# Patient Record
Sex: FEMALE | Race: BLACK OR AFRICAN AMERICAN | NOT HISPANIC OR LATINO | URBAN - METROPOLITAN AREA
[De-identification: names, ages, dates, MRNs, and addresses within clinical notes are randomized per-mention and may not be internally consistent; named-entity substitution may affect disease eponyms.]

---

## 2018-02-19 ENCOUNTER — EMERGENCY (EMERGENCY)
Facility: HOSPITAL | Age: 38
LOS: 1 days | Discharge: HOME | End: 2018-02-19
Attending: STUDENT IN AN ORGANIZED HEALTH CARE EDUCATION/TRAINING PROGRAM
Payer: MEDICAID

## 2018-02-19 VITALS
WEIGHT: 220.02 LBS | SYSTOLIC BLOOD PRESSURE: 150 MMHG | HEART RATE: 96 BPM | HEIGHT: 66 IN | OXYGEN SATURATION: 97 % | RESPIRATION RATE: 20 BRPM | DIASTOLIC BLOOD PRESSURE: 84 MMHG | TEMPERATURE: 98 F

## 2018-02-19 DIAGNOSIS — Z98.890 OTHER SPECIFIED POSTPROCEDURAL STATES: Chronic | ICD-10-CM

## 2018-02-19 LAB
BASOPHILS # BLD AUTO: 0 K/UL — SIGNIFICANT CHANGE UP (ref 0–0.2)
BASOPHILS NFR BLD AUTO: 0.3 % — SIGNIFICANT CHANGE UP (ref 0–2)
EOSINOPHIL # BLD AUTO: 0.1 K/UL — SIGNIFICANT CHANGE UP (ref 0–0.5)
EOSINOPHIL NFR BLD AUTO: 0.9 % — SIGNIFICANT CHANGE UP (ref 0–6)
HCT VFR BLD CALC: 43 % — SIGNIFICANT CHANGE UP (ref 37–47)
HGB BLD-MCNC: 13.4 G/DL — SIGNIFICANT CHANGE UP (ref 12–16)
LYMPHOCYTES # BLD AUTO: 3 K/UL — SIGNIFICANT CHANGE UP (ref 1–4.8)
LYMPHOCYTES # BLD AUTO: 43.1 % — SIGNIFICANT CHANGE UP (ref 20–55)
MCHC RBC-ENTMCNC: 27.2 PG — SIGNIFICANT CHANGE UP (ref 27–31)
MCHC RBC-ENTMCNC: 31.2 G/DL — LOW (ref 32–36)
MCV RBC AUTO: 87.2 FL — SIGNIFICANT CHANGE UP (ref 81–99)
MONOCYTES # BLD AUTO: 0.6 K/UL — SIGNIFICANT CHANGE UP (ref 0–0.8)
MONOCYTES NFR BLD AUTO: 8.6 % — SIGNIFICANT CHANGE UP (ref 3–10)
NEUTROPHILS # BLD AUTO: 3.2 K/UL — SIGNIFICANT CHANGE UP (ref 1.8–8)
NEUTROPHILS NFR BLD AUTO: 47 % — SIGNIFICANT CHANGE UP (ref 37–73)
PLATELET # BLD AUTO: 257 K/UL — SIGNIFICANT CHANGE UP (ref 150–400)
RBC # BLD: 4.93 M/UL — SIGNIFICANT CHANGE UP (ref 4.4–5.2)
RBC # FLD: 15.1 % — SIGNIFICANT CHANGE UP (ref 11–15.6)
WBC # BLD: 6.9 K/UL — SIGNIFICANT CHANGE UP (ref 4.8–10.8)
WBC # FLD AUTO: 6.9 K/UL — SIGNIFICANT CHANGE UP (ref 4.8–10.8)

## 2018-02-19 PROCEDURE — 99284 EMERGENCY DEPT VISIT MOD MDM: CPT

## 2018-02-19 RX ORDER — ONDANSETRON 8 MG/1
4 TABLET, FILM COATED ORAL ONCE
Qty: 0 | Refills: 0 | Status: COMPLETED | OUTPATIENT
Start: 2018-02-19 | End: 2018-02-19

## 2018-02-19 RX ORDER — KETOROLAC TROMETHAMINE 30 MG/ML
15 SYRINGE (ML) INJECTION ONCE
Qty: 0 | Refills: 0 | Status: DISCONTINUED | OUTPATIENT
Start: 2018-02-19 | End: 2018-02-19

## 2018-02-19 RX ORDER — SODIUM CHLORIDE 9 MG/ML
1000 INJECTION INTRAMUSCULAR; INTRAVENOUS; SUBCUTANEOUS
Qty: 0 | Refills: 0 | Status: DISCONTINUED | OUTPATIENT
Start: 2018-02-19 | End: 2018-02-23

## 2018-02-19 RX ORDER — SODIUM CHLORIDE 9 MG/ML
3 INJECTION INTRAMUSCULAR; INTRAVENOUS; SUBCUTANEOUS ONCE
Qty: 0 | Refills: 0 | Status: COMPLETED | OUTPATIENT
Start: 2018-02-19 | End: 2018-02-19

## 2018-02-19 RX ORDER — SODIUM CHLORIDE 9 MG/ML
1000 INJECTION INTRAMUSCULAR; INTRAVENOUS; SUBCUTANEOUS ONCE
Qty: 0 | Refills: 0 | Status: COMPLETED | OUTPATIENT
Start: 2018-02-19 | End: 2018-02-19

## 2018-02-19 RX ORDER — MORPHINE SULFATE 50 MG/1
4 CAPSULE, EXTENDED RELEASE ORAL ONCE
Qty: 0 | Refills: 0 | Status: DISCONTINUED | OUTPATIENT
Start: 2018-02-19 | End: 2018-02-19

## 2018-02-19 RX ADMIN — SODIUM CHLORIDE 1000 MILLILITER(S): 9 INJECTION INTRAMUSCULAR; INTRAVENOUS; SUBCUTANEOUS at 23:20

## 2018-02-19 RX ADMIN — Medication 15 MILLIGRAM(S): at 23:20

## 2018-02-19 RX ADMIN — MORPHINE SULFATE 4 MILLIGRAM(S): 50 CAPSULE, EXTENDED RELEASE ORAL at 23:21

## 2018-02-19 RX ADMIN — SODIUM CHLORIDE 3 MILLILITER(S): 9 INJECTION INTRAMUSCULAR; INTRAVENOUS; SUBCUTANEOUS at 23:22

## 2018-02-19 RX ADMIN — ONDANSETRON 4 MILLIGRAM(S): 8 TABLET, FILM COATED ORAL at 23:20

## 2018-02-19 RX ADMIN — Medication 15 MILLIGRAM(S): at 23:50

## 2018-02-19 RX ADMIN — MORPHINE SULFATE 4 MILLIGRAM(S): 50 CAPSULE, EXTENDED RELEASE ORAL at 23:50

## 2018-02-19 NOTE — ED ADULT NURSE NOTE - CHPI ED SYMPTOMS NEG
no abdominal distension/no hematuria/no blood in stool/no vomiting/no burning urination/no dysuria/no chills/no fever

## 2018-02-19 NOTE — ED PROVIDER NOTE - CONSTITUTIONAL, MLM
normal... Well appearing, well nourished, awake, alert, oriented to person, place, time/situation and in mild to moderate painful distress.

## 2018-02-19 NOTE — ED PROVIDER NOTE - PROGRESS NOTE DETAILS
CT is as noted. Pain with diminished tenderness but continues to have abdominal discomfort. Surgical consultation recommended. CT is as noted. Pain with diminished tenderness but continues to have abdominal discomfort more upper then lower. Surgical consultation recommended. Patient improved. Surgery cleared. Patient feeling better. Will need treatment for likely gastritis as cause of symptoms.

## 2018-02-19 NOTE — ED ADULT NURSE NOTE - OBJECTIVE STATEMENT
Sudden onset, RUQ twisting abd pain radiating into back with associated nausea, no vomiting, reports scant amt diarrhea episode, reports decreased appetite, denies any other complaints.  Reports hernia repair 2015 and pain in that area. Reports D&C 01/10/18 for fibroids

## 2018-02-19 NOTE — ED PROVIDER NOTE - OBJECTIVE STATEMENT
37 y/o F w/ PMHx of peptic ulcer and gastritis (dx few months ago) presents to ED c/o abd pain since this AM. Associated sx include nausea. Pt states she woke up and ate breakfast when her pain suddenly onset. Pain is described as cramping, stabbing pain. She reports taking TUMS to no relief. Denies vomiting, diarrhea, fever, chills or any other complaints at this time. PMD: Dr. Johnson in [place], [place]. 39 y/o F w/ PMHx of peptic ulcer and gastritis (dx few months ago) presents to ED c/o abd pain since this AM. Associated sx include nausea. Pt states she woke up and ate breakfast after which her pain suddenly onset. Pain is described as cramping, and stabbing. She reports taking TUMS to no relief. Denies vomiting, diarrhea, fever, chills or any other complaints at this time. PMD: Dr. Johnson in [place], [place]. 39 y/o F w/ PMHx of peptic ulcer and gastritis (dx few months ago) presents to ED c/o abd pain since this AM. Associated sx include nausea. Pt states she woke up and ate breakfast after which her pain suddenly onset. Pain is described as cramping, and stabbing. She reports taking TUMS to no relief. Denies vomiting, diarrhea, fever, chills or any other complaints at this time. PMD: Dr. Valdivia in [place], [place].

## 2018-02-20 VITALS
HEART RATE: 74 BPM | TEMPERATURE: 98 F | DIASTOLIC BLOOD PRESSURE: 76 MMHG | OXYGEN SATURATION: 97 % | RESPIRATION RATE: 20 BRPM | SYSTOLIC BLOOD PRESSURE: 126 MMHG

## 2018-02-20 LAB
ALBUMIN SERPL ELPH-MCNC: 3.9 G/DL — SIGNIFICANT CHANGE UP (ref 3.3–5.2)
ALP SERPL-CCNC: 78 U/L — SIGNIFICANT CHANGE UP (ref 40–120)
ALT FLD-CCNC: 20 U/L — SIGNIFICANT CHANGE UP
ANION GAP SERPL CALC-SCNC: 14 MMOL/L — SIGNIFICANT CHANGE UP (ref 5–17)
APPEARANCE UR: ABNORMAL
AST SERPL-CCNC: 18 U/L — SIGNIFICANT CHANGE UP
BACTERIA # UR AUTO: ABNORMAL
BILIRUB SERPL-MCNC: 0.4 MG/DL — SIGNIFICANT CHANGE UP (ref 0.4–2)
BILIRUB UR-MCNC: NEGATIVE — SIGNIFICANT CHANGE UP
BUN SERPL-MCNC: 8 MG/DL — SIGNIFICANT CHANGE UP (ref 8–20)
CALCIUM SERPL-MCNC: 9.3 MG/DL — SIGNIFICANT CHANGE UP (ref 8.6–10.2)
CHLORIDE SERPL-SCNC: 100 MMOL/L — SIGNIFICANT CHANGE UP (ref 98–107)
CO2 SERPL-SCNC: 24 MMOL/L — SIGNIFICANT CHANGE UP (ref 22–29)
COLOR SPEC: YELLOW — SIGNIFICANT CHANGE UP
COMMENT - URINE: SIGNIFICANT CHANGE UP
COMMENT - URINE: SIGNIFICANT CHANGE UP
CREAT SERPL-MCNC: 0.54 MG/DL — SIGNIFICANT CHANGE UP (ref 0.5–1.3)
DIFF PNL FLD: NEGATIVE — SIGNIFICANT CHANGE UP
EPI CELLS # UR: ABNORMAL
GLUCOSE SERPL-MCNC: 106 MG/DL — SIGNIFICANT CHANGE UP (ref 70–115)
GLUCOSE UR QL: NEGATIVE MG/DL — SIGNIFICANT CHANGE UP
HCG UR QL: NEGATIVE — SIGNIFICANT CHANGE UP
KETONES UR-MCNC: ABNORMAL
LEUKOCYTE ESTERASE UR-ACNC: ABNORMAL
LIDOCAIN IGE QN: 24 U/L — SIGNIFICANT CHANGE UP (ref 22–51)
NITRITE UR-MCNC: NEGATIVE — SIGNIFICANT CHANGE UP
PH UR: 6 — SIGNIFICANT CHANGE UP (ref 5–8)
POTASSIUM SERPL-MCNC: 4 MMOL/L — SIGNIFICANT CHANGE UP (ref 3.5–5.3)
POTASSIUM SERPL-SCNC: 4 MMOL/L — SIGNIFICANT CHANGE UP (ref 3.5–5.3)
PROT SERPL-MCNC: 8.2 G/DL — SIGNIFICANT CHANGE UP (ref 6.6–8.7)
PROT UR-MCNC: 15 MG/DL
RBC CASTS # UR COMP ASSIST: SIGNIFICANT CHANGE UP /HPF (ref 0–4)
SODIUM SERPL-SCNC: 138 MMOL/L — SIGNIFICANT CHANGE UP (ref 135–145)
SP GR SPEC: 1.02 — SIGNIFICANT CHANGE UP (ref 1.01–1.02)
UROBILINOGEN FLD QL: NEGATIVE MG/DL — SIGNIFICANT CHANGE UP
WBC UR QL: SIGNIFICANT CHANGE UP

## 2018-02-20 PROCEDURE — 74177 CT ABD & PELVIS W/CONTRAST: CPT

## 2018-02-20 PROCEDURE — 96374 THER/PROPH/DIAG INJ IV PUSH: CPT | Mod: XU

## 2018-02-20 PROCEDURE — 96375 TX/PRO/DX INJ NEW DRUG ADDON: CPT

## 2018-02-20 PROCEDURE — 81001 URINALYSIS AUTO W/SCOPE: CPT

## 2018-02-20 PROCEDURE — 81025 URINE PREGNANCY TEST: CPT

## 2018-02-20 PROCEDURE — 85027 COMPLETE CBC AUTOMATED: CPT

## 2018-02-20 PROCEDURE — 74177 CT ABD & PELVIS W/CONTRAST: CPT | Mod: 26

## 2018-02-20 PROCEDURE — 96376 TX/PRO/DX INJ SAME DRUG ADON: CPT

## 2018-02-20 PROCEDURE — 84702 CHORIONIC GONADOTROPIN TEST: CPT

## 2018-02-20 PROCEDURE — 36415 COLL VENOUS BLD VENIPUNCTURE: CPT

## 2018-02-20 PROCEDURE — 83690 ASSAY OF LIPASE: CPT

## 2018-02-20 PROCEDURE — 80053 COMPREHEN METABOLIC PANEL: CPT

## 2018-02-20 PROCEDURE — 99284 EMERGENCY DEPT VISIT MOD MDM: CPT | Mod: 25

## 2018-02-20 RX ORDER — METOCLOPRAMIDE HCL 10 MG
10 TABLET ORAL ONCE
Qty: 0 | Refills: 0 | Status: COMPLETED | OUTPATIENT
Start: 2018-02-20 | End: 2018-02-20

## 2018-02-20 RX ORDER — KETOROLAC TROMETHAMINE 30 MG/ML
15 SYRINGE (ML) INJECTION ONCE
Qty: 0 | Refills: 0 | Status: DISCONTINUED | OUTPATIENT
Start: 2018-02-20 | End: 2018-02-20

## 2018-02-20 RX ORDER — SUCRALFATE 1 G
10 TABLET ORAL
Qty: 200 | Refills: 0 | OUTPATIENT
Start: 2018-02-20 | End: 2018-02-24

## 2018-02-20 RX ORDER — LIDOCAINE 4 G/100G
10 CREAM TOPICAL ONCE
Qty: 0 | Refills: 0 | Status: COMPLETED | OUTPATIENT
Start: 2018-02-20 | End: 2018-02-20

## 2018-02-20 RX ORDER — FAMOTIDINE 10 MG/ML
20 INJECTION INTRAVENOUS ONCE
Qty: 0 | Refills: 0 | Status: COMPLETED | OUTPATIENT
Start: 2018-02-20 | End: 2018-02-20

## 2018-02-20 RX ORDER — MORPHINE SULFATE 50 MG/1
4 CAPSULE, EXTENDED RELEASE ORAL ONCE
Qty: 0 | Refills: 0 | Status: DISCONTINUED | OUTPATIENT
Start: 2018-02-20 | End: 2018-02-20

## 2018-02-20 RX ORDER — OMEPRAZOLE 10 MG/1
1 CAPSULE, DELAYED RELEASE ORAL
Qty: 21 | Refills: 0 | OUTPATIENT
Start: 2018-02-20 | End: 2018-03-12

## 2018-02-20 RX ADMIN — FAMOTIDINE 20 MILLIGRAM(S): 10 INJECTION INTRAVENOUS at 06:03

## 2018-02-20 RX ADMIN — SODIUM CHLORIDE 125 MILLILITER(S): 9 INJECTION INTRAMUSCULAR; INTRAVENOUS; SUBCUTANEOUS at 01:27

## 2018-02-20 RX ADMIN — Medication 15 MILLIGRAM(S): at 01:26

## 2018-02-20 RX ADMIN — Medication 30 MILLILITER(S): at 06:02

## 2018-02-20 RX ADMIN — MORPHINE SULFATE 4 MILLIGRAM(S): 50 CAPSULE, EXTENDED RELEASE ORAL at 02:00

## 2018-02-20 RX ADMIN — Medication 10 MILLIGRAM(S): at 03:51

## 2018-02-20 RX ADMIN — MORPHINE SULFATE 4 MILLIGRAM(S): 50 CAPSULE, EXTENDED RELEASE ORAL at 01:26

## 2018-02-20 RX ADMIN — Medication 15 MILLIGRAM(S): at 02:18

## 2018-02-20 RX ADMIN — LIDOCAINE 10 MILLILITER(S): 4 CREAM TOPICAL at 06:02

## 2018-02-20 NOTE — CONSULT NOTE ADULT - SUBJECTIVE AND OBJECTIVE BOX
ACUTE CARE SURGERY CONSULT    Consulting surgical team: [facility] - Acute Care Surgery (pager [phone])  Consulting attending:  Patient seen and examined: 18 @ 09:47    HPI:  Patient is a 38y Female with PMHx of PUD- managed by her PMD. Patient is seen in the ED with cc of epigastric pain that radiates to the back and "burning" in nature. She states that the pain began yesterday after a meal and nothing has either improved or relieved it . She denies f/c/n/v, SOB or CP. Last meal was around 5:30pm. She denies ever having RLQ pain. She describes this abdominal pain similar to when she was first dx with PUD.          PAST MEDICAL HISTORY:  Gastritis   PUD    PAST SURGICAL HISTORY:  Umbilical hernia repair @ [facility] in     [facility]    ALLERGIES:  No Known Allergies    SOCIAL HISTORY:   States 1/2 ppd  denies illegal substance abuse or EtOH use     FAMILY HISTORY:  Grandmother- DM  Mother HTN.     MEDICATIONS  (STANDING):  sodium chloride 0.9%. 1000 milliLiter(s) (125 mL/Hr) IV Continuous <Continuous>    MEDICATIONS  (PRN):      VITALS & I/Os:  Vital Signs Last 24 Hrs  T(C): 36.4 (2018 06:16), Max: 36.9 (2018 20:26)  T(F): 97.6 (2018 06:16), Max: 98.4 (2018 20:26)  HR: 74 (2018 06:16) (74 - 96)  BP: 126/76 (2018 06:16) (119/83 - 150/84)  BP(mean): --  RR: 20 (2018 06:16) (20 - 20)  SpO2: 97% (2018 06:16) (96% - 97%)  CAPILLARY BLOOD GLUCOSE          I&O's Summary        GEN: NAD, alert and oriented x 3  HEENT: WNL  CHEST: Symmetrical chest rise, breath sounds CTAB  HEART: RRR, non-muffled heart sounds  ABD: Soft, non-tender in the RLQ, tender in the epigastric area only, non-distended, (-)Mcburney's sign, no rebound or guarding   EXT/VASC: no pitting edema     LABS:                        13.4   6.9   )-----------( 257      ( 2018 23:35 )             43.0     02-    138  |  100  |  8.0  ----------------------------<  106  4.0   |  24.0  |  0.54    Ca    9.3      2018 23:35    TPro  8.2  /  Alb  3.9  /  TBili  0.4  /  DBili  x   /  AST  18  /  ALT  20  /  AlkPhos  78  02-    Lactate: sodium chloride 0.9%. 1000 milliLiter(s) IV Continuous <Continuous>               Urinalysis Basic - ( 2018 01:36 )    Color: Yellow / Appearance: Slightly Turbid / S.025 / pH: x  Gluc: x / Ketone: Trace  / Bili: Negative / Urobili: Negative mg/dL   Blood: x / Protein: 15 mg/dL / Nitrite: Negative   Leuk Esterase: Trace / RBC: 0-2 /HPF / WBC 3-5   Sq Epi: x / Non Sq Epi: Many / Bacteria: Moderate        IMAGING:    CT A/P: Oral contrast is seen within normal caliber small bowel loops. No evidence of bowel obstruction. The appendix is borderline in axial   dimension measuring up to 7 mm. Air fills the distal tip and there are no surrounding inflammatory changes. Trace hyperenhancement of the wall may   be present. Scattered diverticula noted without evidence of diverticulitis ACUTE CARE SURGERY CONSULT    Consulting surgical team: [facility] - Acute Care Surgery (pager [phone])  Consulting attending:  Patient seen and examined: 18 @ 04:47    HPI:  Patient is a 38y Female with PMHx of PUD- managed by her PMD. Patient is seen in the ED with cc of epigastric pain that radiates to the back and "burning" in nature. She states that the pain began yesterday after a meal and nothing has either improved or relieved it . She denies f/c/n/v, SOB or CP. Last meal was around 5:30pm. She denies ever having RLQ pain. She describes this abdominal pain similar to when she was first dx with PUD.          PAST MEDICAL HISTORY:  Gastritis   PUD    PAST SURGICAL HISTORY:  Umbilical hernia repair @ [facility] in     [place]    ALLERGIES:  No Known Allergies    SOCIAL HISTORY:   States 1/2 ppd  denies illegal substance abuse or EtOH use     FAMILY HISTORY:  Grandmother- DM  Mother HTN.     MEDICATIONS  (STANDING):  sodium chloride 0.9%. 1000 milliLiter(s) (125 mL/Hr) IV Continuous <Continuous>    MEDICATIONS  (PRN):      VITALS & I/Os:  Vital Signs Last 24 Hrs  T(C): 36.4 (2018 06:16), Max: 36.9 (2018 20:26)  T(F): 97.6 (2018 06:16), Max: 98.4 (2018 20:26)  HR: 74 (2018 06:16) (74 - 96)  BP: 126/76 (2018 06:16) (119/83 - 150/84)  BP(mean): --  RR: 20 (2018 06:16) (20 - 20)  SpO2: 97% (2018 06:16) (96% - 97%)  CAPILLARY BLOOD GLUCOSE          I&O's Summary        GEN: NAD, alert and oriented x 3  HEENT: WNL  CHEST: Symmetrical chest rise, breath sounds CTAB  HEART: RRR, non-muffled heart sounds  ABD: Soft, non-tender in the RLQ, tender in the epigastric area only, non-distended, (-)Mcburney's sign, no rebound or guarding   EXT/VASC: no pitting edema     LABS:                        13.4   6.9   )-----------( 257      ( 2018 23:35 )             43.0     02-    138  |  100  |  8.0  ----------------------------<  106  4.0   |  24.0  |  0.54    Ca    9.3      2018 23:35    TPro  8.2  /  Alb  3.9  /  TBili  0.4  /  DBili  x   /  AST  18  /  ALT  20  /  AlkPhos  78  02-    Lactate: sodium chloride 0.9%. 1000 milliLiter(s) IV Continuous <Continuous>               Urinalysis Basic - ( 2018 01:36 )    Color: Yellow / Appearance: Slightly Turbid / S.025 / pH: x  Gluc: x / Ketone: Trace  / Bili: Negative / Urobili: Negative mg/dL   Blood: x / Protein: 15 mg/dL / Nitrite: Negative   Leuk Esterase: Trace / RBC: 0-2 /HPF / WBC 3-5   Sq Epi: x / Non Sq Epi: Many / Bacteria: Moderate        IMAGING:    CT A/P: Oral contrast is seen within normal caliber small bowel loops. No evidence of bowel obstruction. The appendix is borderline in axial   dimension measuring up to 7 mm. Air fills the distal tip and there are no surrounding inflammatory changes. Trace hyperenhancement of the wall may   be present. Scattered diverticula noted without evidence of diverticulitis

## 2018-02-20 NOTE — ED ADULT NURSE REASSESSMENT NOTE - NS ED NURSE REASSESS COMMENT FT1
POC discussed with pt who verbalize abd discomfort as unchanged, non-tender on palpation, MD aware of finding, awaiting orders, pt awaiting surgical consult, pt verbalize understanding.  Safety measures maintained.

## 2018-02-20 NOTE — CONSULT NOTE ADULT - ASSESSMENT
ASSESSMENT & PLAN       38y F with epigastric pain. Most likely due to patient's hx of PUD. Least likely acute appendicitis       Discussed with Dr. Soriano    -no acute surgical intervention is indicated  - recommend GI eval for PUD  -r/o H.Pylori  -serial abd exam  -if patient is admitted will follow ASSESSMENT & PLAN       38y F with epigastric pain. Most likely due to patient's hx of PUD. Least likely acute appendicitis. Plan was d/w Dr. Jaeger in person      Discussed with Dr. Soriano    -no acute surgical intervention is indicated  - recommend GI eval for PUD  -r/o H.Pylori  -serial abd exam  -if patient is admitted will follow

## 2018-02-20 NOTE — ED ADULT NURSE REASSESSMENT NOTE - NS ED NURSE REASSESS COMMENT FT1
Pt reports feeling better, discharge instructions given and explained, including discharge medication purpose, dose, frequency and s/e, pt verbalized understanding of instructions, questions encouraged and answered appropriately, PIV d/c'd per facility protocol, pt tolerated well, DCD in place, pt safely discharged home.

## 2018-08-12 ENCOUNTER — EMERGENCY (EMERGENCY)
Facility: HOSPITAL | Age: 38
LOS: 1 days | Discharge: HOME | End: 2018-08-12
Attending: EMERGENCY MEDICINE
Payer: MEDICAID

## 2018-08-12 VITALS
TEMPERATURE: 98 F | DIASTOLIC BLOOD PRESSURE: 74 MMHG | OXYGEN SATURATION: 96 % | HEART RATE: 72 BPM | SYSTOLIC BLOOD PRESSURE: 120 MMHG | RESPIRATION RATE: 19 BRPM

## 2018-08-12 VITALS — HEIGHT: 67 IN | WEIGHT: 199.96 LBS

## 2018-08-12 DIAGNOSIS — Z98.890 OTHER SPECIFIED POSTPROCEDURAL STATES: Chronic | ICD-10-CM

## 2018-08-12 PROBLEM — K29.70 GASTRITIS, UNSPECIFIED, WITHOUT BLEEDING: Chronic | Status: ACTIVE | Noted: 2018-02-26

## 2018-08-12 PROBLEM — K27.9 PEPTIC ULCER, SITE UNSPECIFIED, UNSPECIFIED AS ACUTE OR CHRONIC, WITHOUT HEMORRHAGE OR PERFORATION: Chronic | Status: ACTIVE | Noted: 2018-02-26

## 2018-08-12 LAB
ALBUMIN SERPL ELPH-MCNC: 3.7 G/DL — SIGNIFICANT CHANGE UP (ref 3.3–5.2)
ALP SERPL-CCNC: 96 U/L — SIGNIFICANT CHANGE UP (ref 40–120)
ALT FLD-CCNC: 28 U/L — SIGNIFICANT CHANGE UP
ANION GAP SERPL CALC-SCNC: 13 MMOL/L — SIGNIFICANT CHANGE UP (ref 5–17)
APPEARANCE UR: CLEAR — SIGNIFICANT CHANGE UP
AST SERPL-CCNC: 32 U/L — HIGH
BACTERIA # UR AUTO: ABNORMAL
BASOPHILS # BLD AUTO: 0 K/UL — SIGNIFICANT CHANGE UP (ref 0–0.2)
BASOPHILS NFR BLD AUTO: 0.5 % — SIGNIFICANT CHANGE UP (ref 0–2)
BILIRUB SERPL-MCNC: 0.3 MG/DL — LOW (ref 0.4–2)
BILIRUB UR-MCNC: NEGATIVE — SIGNIFICANT CHANGE UP
BUN SERPL-MCNC: 8 MG/DL — SIGNIFICANT CHANGE UP (ref 8–20)
CALCIUM SERPL-MCNC: 9.6 MG/DL — SIGNIFICANT CHANGE UP (ref 8.6–10.2)
CHLORIDE SERPL-SCNC: 102 MMOL/L — SIGNIFICANT CHANGE UP (ref 98–107)
CO2 SERPL-SCNC: 24 MMOL/L — SIGNIFICANT CHANGE UP (ref 22–29)
COLOR SPEC: YELLOW — SIGNIFICANT CHANGE UP
CREAT SERPL-MCNC: 0.57 MG/DL — SIGNIFICANT CHANGE UP (ref 0.5–1.3)
DIFF PNL FLD: ABNORMAL
EOSINOPHIL # BLD AUTO: 0.1 K/UL — SIGNIFICANT CHANGE UP (ref 0–0.5)
EOSINOPHIL NFR BLD AUTO: 1.2 % — SIGNIFICANT CHANGE UP (ref 0–6)
EPI CELLS # UR: SIGNIFICANT CHANGE UP
GLUCOSE SERPL-MCNC: 112 MG/DL — SIGNIFICANT CHANGE UP (ref 70–115)
GLUCOSE UR QL: NEGATIVE MG/DL — SIGNIFICANT CHANGE UP
HCG UR QL: NEGATIVE — SIGNIFICANT CHANGE UP
HCT VFR BLD CALC: 42.9 % — SIGNIFICANT CHANGE UP (ref 37–47)
HGB BLD-MCNC: 13.7 G/DL — SIGNIFICANT CHANGE UP (ref 12–16)
KETONES UR-MCNC: NEGATIVE — SIGNIFICANT CHANGE UP
LEUKOCYTE ESTERASE UR-ACNC: ABNORMAL
LIDOCAIN IGE QN: 38 U/L — SIGNIFICANT CHANGE UP (ref 22–51)
LYMPHOCYTES # BLD AUTO: 2 K/UL — SIGNIFICANT CHANGE UP (ref 1–4.8)
LYMPHOCYTES # BLD AUTO: 33.7 % — SIGNIFICANT CHANGE UP (ref 20–55)
MCHC RBC-ENTMCNC: 27.7 PG — SIGNIFICANT CHANGE UP (ref 27–31)
MCHC RBC-ENTMCNC: 31.9 G/DL — LOW (ref 32–36)
MCV RBC AUTO: 86.7 FL — SIGNIFICANT CHANGE UP (ref 81–99)
MONOCYTES # BLD AUTO: 0.5 K/UL — SIGNIFICANT CHANGE UP (ref 0–0.8)
MONOCYTES NFR BLD AUTO: 8 % — SIGNIFICANT CHANGE UP (ref 3–10)
NEUTROPHILS # BLD AUTO: 3.3 K/UL — SIGNIFICANT CHANGE UP (ref 1.8–8)
NEUTROPHILS NFR BLD AUTO: 56.4 % — SIGNIFICANT CHANGE UP (ref 37–73)
NITRITE UR-MCNC: POSITIVE
PH UR: 6 — SIGNIFICANT CHANGE UP (ref 5–8)
PLATELET # BLD AUTO: 303 K/UL — SIGNIFICANT CHANGE UP (ref 150–400)
POTASSIUM SERPL-MCNC: 4.8 MMOL/L — SIGNIFICANT CHANGE UP (ref 3.5–5.3)
POTASSIUM SERPL-SCNC: 4.8 MMOL/L — SIGNIFICANT CHANGE UP (ref 3.5–5.3)
PROT SERPL-MCNC: 8.2 G/DL — SIGNIFICANT CHANGE UP (ref 6.6–8.7)
PROT UR-MCNC: 30 MG/DL
RBC # BLD: 4.95 M/UL — SIGNIFICANT CHANGE UP (ref 4.4–5.2)
RBC # FLD: 16.8 % — HIGH (ref 11–15.6)
RBC CASTS # UR COMP ASSIST: >50 /HPF (ref 0–4)
SODIUM SERPL-SCNC: 139 MMOL/L — SIGNIFICANT CHANGE UP (ref 135–145)
SP GR SPEC: 1.02 — SIGNIFICANT CHANGE UP (ref 1.01–1.02)
UROBILINOGEN FLD QL: 4 MG/DL
WBC # BLD: 5.9 K/UL — SIGNIFICANT CHANGE UP (ref 4.8–10.8)
WBC # FLD AUTO: 5.9 K/UL — SIGNIFICANT CHANGE UP (ref 4.8–10.8)
WBC UR QL: SIGNIFICANT CHANGE UP

## 2018-08-12 PROCEDURE — 96365 THER/PROPH/DIAG IV INF INIT: CPT

## 2018-08-12 PROCEDURE — 36415 COLL VENOUS BLD VENIPUNCTURE: CPT

## 2018-08-12 PROCEDURE — 99284 EMERGENCY DEPT VISIT MOD MDM: CPT | Mod: 25

## 2018-08-12 PROCEDURE — 83690 ASSAY OF LIPASE: CPT

## 2018-08-12 PROCEDURE — 87086 URINE CULTURE/COLONY COUNT: CPT

## 2018-08-12 PROCEDURE — 81025 URINE PREGNANCY TEST: CPT

## 2018-08-12 PROCEDURE — 80053 COMPREHEN METABOLIC PANEL: CPT

## 2018-08-12 PROCEDURE — 81001 URINALYSIS AUTO W/SCOPE: CPT

## 2018-08-12 PROCEDURE — 96375 TX/PRO/DX INJ NEW DRUG ADDON: CPT

## 2018-08-12 PROCEDURE — 85027 COMPLETE CBC AUTOMATED: CPT

## 2018-08-12 RX ORDER — PANTOPRAZOLE SODIUM 20 MG/1
40 TABLET, DELAYED RELEASE ORAL ONCE
Qty: 0 | Refills: 0 | Status: COMPLETED | OUTPATIENT
Start: 2018-08-12 | End: 2018-08-12

## 2018-08-12 RX ORDER — SUCRALFATE 1 G
1 TABLET ORAL ONCE
Qty: 0 | Refills: 0 | Status: COMPLETED | OUTPATIENT
Start: 2018-08-12 | End: 2018-08-12

## 2018-08-12 RX ORDER — SODIUM CHLORIDE 9 MG/ML
999 INJECTION INTRAMUSCULAR; INTRAVENOUS; SUBCUTANEOUS ONCE
Qty: 0 | Refills: 0 | Status: COMPLETED | OUTPATIENT
Start: 2018-08-12 | End: 2018-08-12

## 2018-08-12 RX ORDER — PANTOPRAZOLE SODIUM 20 MG/1
1 TABLET, DELAYED RELEASE ORAL
Qty: 30 | Refills: 0 | OUTPATIENT
Start: 2018-08-12 | End: 2018-09-10

## 2018-08-12 RX ORDER — SUCRALFATE 1 G
10 TABLET ORAL
Qty: 600 | Refills: 0 | OUTPATIENT
Start: 2018-08-12 | End: 2018-08-26

## 2018-08-12 RX ORDER — AMOXICILLIN 250 MG/5ML
1 SUSPENSION, RECONSTITUTED, ORAL (ML) ORAL
Qty: 30 | Refills: 0 | OUTPATIENT
Start: 2018-08-12 | End: 2018-08-21

## 2018-08-12 RX ORDER — CEFTRIAXONE 500 MG/1
1 INJECTION, POWDER, FOR SOLUTION INTRAMUSCULAR; INTRAVENOUS ONCE
Qty: 0 | Refills: 0 | Status: DISCONTINUED | OUTPATIENT
Start: 2018-08-12 | End: 2018-08-17

## 2018-08-12 RX ORDER — METOCLOPRAMIDE HCL 10 MG
10 TABLET ORAL ONCE
Qty: 0 | Refills: 0 | Status: COMPLETED | OUTPATIENT
Start: 2018-08-12 | End: 2018-08-12

## 2018-08-12 RX ADMIN — SODIUM CHLORIDE 999 MILLILITER(S): 9 INJECTION INTRAMUSCULAR; INTRAVENOUS; SUBCUTANEOUS at 05:06

## 2018-08-12 RX ADMIN — Medication 10 MILLIGRAM(S): at 06:20

## 2018-08-12 RX ADMIN — Medication 104 MILLIGRAM(S): at 05:06

## 2018-08-12 RX ADMIN — Medication 1 GRAM(S): at 06:20

## 2018-08-12 RX ADMIN — PANTOPRAZOLE SODIUM 40 MILLIGRAM(S): 20 TABLET, DELAYED RELEASE ORAL at 05:06

## 2018-08-12 NOTE — ED PROVIDER NOTE - PHYSICAL EXAMINATION
Constitutional : Appears comfortably, talking in full sentences  Head :NC AT , no swelling  Eyes :eomi, no swelling  Mouth :mm moist,  Neck : supple, trachea in midline  Chest :Enrique air entry, symm chest expansion, no distress  Heart :S1 S2 distant  Abdomen :abd soft, non tender  Musc/Skel :ext no swelling, no deformity, no spine tenderness, distal pulses present  Neuro  :AAO 3 no focal deficits Constitutional : Appears comfortably, talking in full sentences  Head :NC AT , no swelling  Eyes :eomi, no swelling  Mouth :mm moist,  Neck : supple, trachea in midline  Chest :Enrique air entry, symm chest expansion, no distress  Heart :S1 S2 distant  Abdomen :abd soft, non tender, deeper ivy palpation  Musc/Skel :ext no swelling, no deformity, no spine tenderness, distal pulses present  Neuro  :AAO 3 no focal deficits

## 2018-08-12 NOTE — ED ADULT NURSE NOTE - OBJECTIVE STATEMENT
Received patient lying in bed, a&ox3, able to make needs known. Patient states shes having a flare up from her gastritis, complains of abdominal pain, 5/10. Received patient lying in bed, a&ox3, able to make needs known. Patient states shes having a flare up from her gastritis, complains of abdominal pain, 5/10. Patient denies sob, chest pain, dizziness, headache, fever and chills. Patient not in distress. To monitor

## 2018-08-12 NOTE — ED ADULT NURSE NOTE - DISCHARGE TEACHING
f/u with PMD in 24-48 hours, if with new or worsening symptoms call 911 or go to the nearest hospital

## 2018-08-12 NOTE — ED PROVIDER NOTE - OBJECTIVE STATEMENT
38y old with complaints of epigastric pain, achy, radiates band like,  assosiated with nausea, no vomiting, 38y old with complaints of epigastric pain, achy, radiates band like,  associated with nausea, no vomiting, previous h/o same, gradual, aggravated with eating, no relieving factors

## 2018-08-12 NOTE — ED PROVIDER NOTE - NS ED ROS FT
no weight change, no fever or chills  no recent travel, no recent abox, no sick contacts  no recent change in medications  no rash, no bruises  no visual changes no eye discharge  no cough cold or congestion,   no sob, no chest pain  no orthopnea, no pnd  no abd pain, no n/v/d  no hematuria, no change in urinary habits  no joint pain, no deformity  no headache, no paresthesia no weight change, no fever or chills  no recent travel, no recent abox, no sick contacts  no recent change in medications  no rash, no bruises  no visual changes no eye discharge  no cough cold or congestion,   no sob, no chest pain  no orthopnea, no pnd  + abd pain, +n/v, -d  no hematuria, no change in urinary habits  no joint pain, no deformity  no headache, no paresthesia

## 2018-08-12 NOTE — ED PROVIDER NOTE - CARE PLAN
Principal Discharge DX:	GERD (gastroesophageal reflux disease)  Secondary Diagnosis:	UTI (urinary tract infection)

## 2018-08-13 LAB
CULTURE RESULTS: SIGNIFICANT CHANGE UP
SPECIMEN SOURCE: SIGNIFICANT CHANGE UP

## 2018-08-15 LAB — H PYLORI AB SER-ACNC: 23.2 UNITS — HIGH

## 2018-08-29 ENCOUNTER — EMERGENCY (EMERGENCY)
Facility: HOSPITAL | Age: 38
LOS: 1 days | Discharge: HOME | End: 2018-08-29
Attending: EMERGENCY MEDICINE
Payer: MEDICAID

## 2018-08-29 DIAGNOSIS — Z98.890 OTHER SPECIFIED POSTPROCEDURAL STATES: Chronic | ICD-10-CM

## 2018-08-29 PROCEDURE — 99284 EMERGENCY DEPT VISIT MOD MDM: CPT | Mod: 25

## 2018-08-30 VITALS
RESPIRATION RATE: 18 BRPM | SYSTOLIC BLOOD PRESSURE: 114 MMHG | HEART RATE: 89 BPM | OXYGEN SATURATION: 98 % | DIASTOLIC BLOOD PRESSURE: 65 MMHG | TEMPERATURE: 99 F

## 2018-08-30 VITALS
DIASTOLIC BLOOD PRESSURE: 90 MMHG | HEIGHT: 66 IN | WEIGHT: 199.96 LBS | SYSTOLIC BLOOD PRESSURE: 133 MMHG | OXYGEN SATURATION: 97 % | HEART RATE: 98 BPM | RESPIRATION RATE: 18 BRPM | TEMPERATURE: 99 F

## 2018-08-30 LAB
ALBUMIN SERPL ELPH-MCNC: 3.7 G/DL — SIGNIFICANT CHANGE UP (ref 3.3–5.2)
ALP SERPL-CCNC: 88 U/L — SIGNIFICANT CHANGE UP (ref 40–120)
ALT FLD-CCNC: 16 U/L — SIGNIFICANT CHANGE UP
ANION GAP SERPL CALC-SCNC: 13 MMOL/L — SIGNIFICANT CHANGE UP (ref 5–17)
APPEARANCE UR: ABNORMAL
AST SERPL-CCNC: 21 U/L — SIGNIFICANT CHANGE UP
BACTERIA # UR AUTO: ABNORMAL
BASOPHILS # BLD AUTO: 0 K/UL — SIGNIFICANT CHANGE UP (ref 0–0.2)
BASOPHILS NFR BLD AUTO: 0.3 % — SIGNIFICANT CHANGE UP (ref 0–2)
BILIRUB SERPL-MCNC: 0.4 MG/DL — SIGNIFICANT CHANGE UP (ref 0.4–2)
BILIRUB UR-MCNC: NEGATIVE — SIGNIFICANT CHANGE UP
BUN SERPL-MCNC: 6 MG/DL — LOW (ref 8–20)
CALCIUM SERPL-MCNC: 8.8 MG/DL — SIGNIFICANT CHANGE UP (ref 8.6–10.2)
CHLORIDE SERPL-SCNC: 104 MMOL/L — SIGNIFICANT CHANGE UP (ref 98–107)
CO2 SERPL-SCNC: 23 MMOL/L — SIGNIFICANT CHANGE UP (ref 22–29)
COLOR SPEC: YELLOW — SIGNIFICANT CHANGE UP
CREAT SERPL-MCNC: 0.62 MG/DL — SIGNIFICANT CHANGE UP (ref 0.5–1.3)
DIFF PNL FLD: ABNORMAL
EOSINOPHIL # BLD AUTO: 0.1 K/UL — SIGNIFICANT CHANGE UP (ref 0–0.5)
EOSINOPHIL NFR BLD AUTO: 1.2 % — SIGNIFICANT CHANGE UP (ref 0–6)
EPI CELLS # UR: SIGNIFICANT CHANGE UP
GLUCOSE SERPL-MCNC: 111 MG/DL — SIGNIFICANT CHANGE UP (ref 70–115)
GLUCOSE UR QL: NEGATIVE MG/DL — SIGNIFICANT CHANGE UP
HCG SERPL-ACNC: <5 MIU/ML — SIGNIFICANT CHANGE UP
HCT VFR BLD CALC: 42 % — SIGNIFICANT CHANGE UP (ref 37–47)
HGB BLD-MCNC: 12.9 G/DL — SIGNIFICANT CHANGE UP (ref 12–16)
KETONES UR-MCNC: ABNORMAL
LEUKOCYTE ESTERASE UR-ACNC: ABNORMAL
LIDOCAIN IGE QN: 24 U/L — SIGNIFICANT CHANGE UP (ref 22–51)
LYMPHOCYTES # BLD AUTO: 2.1 K/UL — SIGNIFICANT CHANGE UP (ref 1–4.8)
LYMPHOCYTES # BLD AUTO: 29.2 % — SIGNIFICANT CHANGE UP (ref 20–55)
MCHC RBC-ENTMCNC: 26.8 PG — LOW (ref 27–31)
MCHC RBC-ENTMCNC: 30.7 G/DL — LOW (ref 32–36)
MCV RBC AUTO: 87.1 FL — SIGNIFICANT CHANGE UP (ref 81–99)
MONOCYTES # BLD AUTO: 0.6 K/UL — SIGNIFICANT CHANGE UP (ref 0–0.8)
MONOCYTES NFR BLD AUTO: 7.9 % — SIGNIFICANT CHANGE UP (ref 3–10)
NEUTROPHILS # BLD AUTO: 4.4 K/UL — SIGNIFICANT CHANGE UP (ref 1.8–8)
NEUTROPHILS NFR BLD AUTO: 61.3 % — SIGNIFICANT CHANGE UP (ref 37–73)
NITRITE UR-MCNC: NEGATIVE — SIGNIFICANT CHANGE UP
PH UR: 5 — SIGNIFICANT CHANGE UP (ref 5–8)
PLATELET # BLD AUTO: 220 K/UL — SIGNIFICANT CHANGE UP (ref 150–400)
POTASSIUM SERPL-MCNC: 4.2 MMOL/L — SIGNIFICANT CHANGE UP (ref 3.5–5.3)
POTASSIUM SERPL-SCNC: 4.2 MMOL/L — SIGNIFICANT CHANGE UP (ref 3.5–5.3)
PROT SERPL-MCNC: 8 G/DL — SIGNIFICANT CHANGE UP (ref 6.6–8.7)
PROT UR-MCNC: 30 MG/DL
RBC # BLD: 4.82 M/UL — SIGNIFICANT CHANGE UP (ref 4.4–5.2)
RBC # FLD: 16.3 % — HIGH (ref 11–15.6)
RBC CASTS # UR COMP ASSIST: ABNORMAL /HPF (ref 0–4)
SODIUM SERPL-SCNC: 140 MMOL/L — SIGNIFICANT CHANGE UP (ref 135–145)
SP GR SPEC: 1.02 — SIGNIFICANT CHANGE UP (ref 1.01–1.02)
UROBILINOGEN FLD QL: 4 MG/DL
WBC # BLD: 7.2 K/UL — SIGNIFICANT CHANGE UP (ref 4.8–10.8)
WBC # FLD AUTO: 7.2 K/UL — SIGNIFICANT CHANGE UP (ref 4.8–10.8)
WBC UR QL: SIGNIFICANT CHANGE UP

## 2018-08-30 PROCEDURE — 80053 COMPREHEN METABOLIC PANEL: CPT

## 2018-08-30 PROCEDURE — 74176 CT ABD & PELVIS W/O CONTRAST: CPT

## 2018-08-30 PROCEDURE — 36415 COLL VENOUS BLD VENIPUNCTURE: CPT

## 2018-08-30 PROCEDURE — 96365 THER/PROPH/DIAG IV INF INIT: CPT

## 2018-08-30 PROCEDURE — 81001 URINALYSIS AUTO W/SCOPE: CPT

## 2018-08-30 PROCEDURE — 85027 COMPLETE CBC AUTOMATED: CPT

## 2018-08-30 PROCEDURE — 87086 URINE CULTURE/COLONY COUNT: CPT

## 2018-08-30 PROCEDURE — 99284 EMERGENCY DEPT VISIT MOD MDM: CPT | Mod: 25

## 2018-08-30 PROCEDURE — 96375 TX/PRO/DX INJ NEW DRUG ADDON: CPT

## 2018-08-30 PROCEDURE — 83690 ASSAY OF LIPASE: CPT

## 2018-08-30 PROCEDURE — 84702 CHORIONIC GONADOTROPIN TEST: CPT

## 2018-08-30 PROCEDURE — 74176 CT ABD & PELVIS W/O CONTRAST: CPT | Mod: 26

## 2018-08-30 RX ORDER — ONDANSETRON 8 MG/1
4 TABLET, FILM COATED ORAL ONCE
Qty: 0 | Refills: 0 | Status: COMPLETED | OUTPATIENT
Start: 2018-08-30 | End: 2018-08-30

## 2018-08-30 RX ORDER — SODIUM CHLORIDE 9 MG/ML
1000 INJECTION INTRAMUSCULAR; INTRAVENOUS; SUBCUTANEOUS ONCE
Qty: 0 | Refills: 0 | Status: COMPLETED | OUTPATIENT
Start: 2018-08-30 | End: 2018-08-30

## 2018-08-30 RX ORDER — SODIUM CHLORIDE 9 MG/ML
3 INJECTION INTRAMUSCULAR; INTRAVENOUS; SUBCUTANEOUS ONCE
Qty: 0 | Refills: 0 | Status: COMPLETED | OUTPATIENT
Start: 2018-08-30 | End: 2018-08-30

## 2018-08-30 RX ORDER — CIPROFLOXACIN LACTATE 400MG/40ML
1 VIAL (ML) INTRAVENOUS
Qty: 10 | Refills: 0 | OUTPATIENT
Start: 2018-08-30 | End: 2018-09-08

## 2018-08-30 RX ORDER — SODIUM CHLORIDE 9 MG/ML
1000 INJECTION INTRAMUSCULAR; INTRAVENOUS; SUBCUTANEOUS
Qty: 0 | Refills: 0 | Status: DISCONTINUED | OUTPATIENT
Start: 2018-08-30 | End: 2018-09-03

## 2018-08-30 RX ORDER — ERTAPENEM SODIUM 1 G/1
1000 INJECTION, POWDER, LYOPHILIZED, FOR SOLUTION INTRAMUSCULAR; INTRAVENOUS EVERY 24 HOURS
Qty: 0 | Refills: 0 | Status: DISCONTINUED | OUTPATIENT
Start: 2018-08-30 | End: 2018-09-03

## 2018-08-30 RX ADMIN — ERTAPENEM SODIUM 1000 MILLIGRAM(S): 1 INJECTION, POWDER, LYOPHILIZED, FOR SOLUTION INTRAMUSCULAR; INTRAVENOUS at 04:00

## 2018-08-30 RX ADMIN — ONDANSETRON 4 MILLIGRAM(S): 8 TABLET, FILM COATED ORAL at 03:28

## 2018-08-30 RX ADMIN — SODIUM CHLORIDE 1000 MILLILITER(S): 9 INJECTION INTRAMUSCULAR; INTRAVENOUS; SUBCUTANEOUS at 03:29

## 2018-08-30 RX ADMIN — ERTAPENEM SODIUM 120 MILLIGRAM(S): 1 INJECTION, POWDER, LYOPHILIZED, FOR SOLUTION INTRAMUSCULAR; INTRAVENOUS at 03:29

## 2018-08-30 RX ADMIN — SODIUM CHLORIDE 3 MILLILITER(S): 9 INJECTION INTRAMUSCULAR; INTRAVENOUS; SUBCUTANEOUS at 02:48

## 2018-08-30 NOTE — ED PROVIDER NOTE - NS ED ROS FT
+back pain  +fever  +chills  +nausea  no diarrhea  no hematuria  no chest pain  no SOB  no abd pain  no HA  All other ROS negative except as per HPI

## 2018-08-30 NOTE — ED ADULT NURSE NOTE - OBJECTIVE STATEMENT
Pt. presents to ED with c/o "flu-like symptoms", nausea, and generalized body aches. Pt. states she felt warm at home, denies any diarrhea or vomiting. recently finished amoxicillin for UTI

## 2018-08-30 NOTE — ED ADULT NURSE NOTE - CHPI ED NUR SYMPTOMS NEG
no fever/no dysuria/no hematuria/no blood in stool/no burning urination/no diarrhea/no vomiting/no chills

## 2018-08-30 NOTE — ED ADULT NURSE NOTE - NSIMPLEMENTINTERV_GEN_ALL_ED
Implemented All Universal Safety Interventions:  [place] to call system. Call bell, personal items and telephone within reach. Instruct patient to call for assistance. Room bathroom lighting operational. Non-slip footwear when patient is off stretcher. Physically safe environment: no spills, clutter or unnecessary equipment. Stretcher in lowest position, wheels locked, appropriate side rails in place.

## 2018-08-30 NOTE — ED ADULT TRIAGE NOTE - CHIEF COMPLAINT QUOTE
Pt walk in c/o subjective fever/chills, nausea, and mid upper back pain worse with movement that sudden onset earlier this afternoon with worsening of sx's. Pt states she has no appetite.

## 2018-08-30 NOTE — ED PROVIDER NOTE - MEDICAL DECISION MAKING DETAILS
Patient states has had cough and fever, PNA on CT, will treat and d/c.  Patient given detailed discharge and return instructions and verbalized understanding.  Patient will follow up without fail.  All questions answered.

## 2018-08-30 NOTE — ED PROVIDER NOTE - OBJECTIVE STATEMENT
CC: back pain  Presenting symptoms: 39 y/o F with recent dx of UTI (recently finished course of amoxacillin) presents to ED c/o back pain with associated fever, chills onset today and urinary frequency onset past few days.  Pt also had nausea and decreased PO intake. She states that the was recently dx with UTI and finished her course of amoxacillin (3xday) in the past few days; however, pt states she is still urinating frequently. Today pt had sudden onset of lower back pain with chills and nausea. She also had been feeling warm and believes she has a fever, though she has not checked the temperature.   Pertinent Positives: +back pain, fever, chills, nausea  Pertinent Negatives: No CP, no SOB, no abd pain, no diarrhea, no hematuria  Timing: today  Quality: achy  Radiation: none  Severity: mild  Aggravating Factors: deep breathing, coughing, movement  Relieving Factors: none

## 2018-08-31 ENCOUNTER — INPATIENT (INPATIENT)
Facility: HOSPITAL | Age: 38
LOS: 3 days | Discharge: HOME | DRG: 175 | End: 2018-09-04
Attending: INTERNAL MEDICINE | Admitting: INTERNAL MEDICINE
Payer: MEDICAID

## 2018-08-31 VITALS — HEIGHT: 66 IN | WEIGHT: 175.05 LBS

## 2018-08-31 DIAGNOSIS — I26.99 OTHER PULMONARY EMBOLISM WITHOUT ACUTE COR PULMONALE: ICD-10-CM

## 2018-08-31 DIAGNOSIS — Z98.890 OTHER SPECIFIED POSTPROCEDURAL STATES: Chronic | ICD-10-CM

## 2018-08-31 LAB
ALBUMIN SERPL ELPH-MCNC: 3.8 G/DL — SIGNIFICANT CHANGE UP (ref 3.3–5.2)
ALP SERPL-CCNC: 96 U/L — SIGNIFICANT CHANGE UP (ref 40–120)
ALT FLD-CCNC: 14 U/L — SIGNIFICANT CHANGE UP
ANION GAP SERPL CALC-SCNC: 13 MMOL/L — SIGNIFICANT CHANGE UP (ref 5–17)
APTT BLD: 135.2 SEC — CRITICAL HIGH (ref 27.5–37.4)
APTT BLD: 26 SEC — LOW (ref 27.5–37.4)
AST SERPL-CCNC: 17 U/L — SIGNIFICANT CHANGE UP
BASOPHILS # BLD AUTO: 0 K/UL — SIGNIFICANT CHANGE UP (ref 0–0.2)
BASOPHILS NFR BLD AUTO: 0.1 % — SIGNIFICANT CHANGE UP (ref 0–2)
BILIRUB SERPL-MCNC: 0.7 MG/DL — SIGNIFICANT CHANGE UP (ref 0.4–2)
BUN SERPL-MCNC: 7 MG/DL — LOW (ref 8–20)
CALCIUM SERPL-MCNC: 8.9 MG/DL — SIGNIFICANT CHANGE UP (ref 8.6–10.2)
CHLORIDE SERPL-SCNC: 101 MMOL/L — SIGNIFICANT CHANGE UP (ref 98–107)
CO2 SERPL-SCNC: 23 MMOL/L — SIGNIFICANT CHANGE UP (ref 22–29)
CREAT SERPL-MCNC: 0.5 MG/DL — SIGNIFICANT CHANGE UP (ref 0.5–1.3)
CULTURE RESULTS: NO GROWTH — SIGNIFICANT CHANGE UP
EOSINOPHIL # BLD AUTO: 0 K/UL — SIGNIFICANT CHANGE UP (ref 0–0.5)
EOSINOPHIL NFR BLD AUTO: 0.1 % — SIGNIFICANT CHANGE UP (ref 0–6)
GLUCOSE SERPL-MCNC: 114 MG/DL — SIGNIFICANT CHANGE UP (ref 70–115)
HCT VFR BLD CALC: 42.1 % — SIGNIFICANT CHANGE UP (ref 37–47)
HCT VFR BLD CALC: 43.5 % — SIGNIFICANT CHANGE UP (ref 37–47)
HGB BLD-MCNC: 12.7 G/DL — SIGNIFICANT CHANGE UP (ref 12–16)
HGB BLD-MCNC: 13.2 G/DL — SIGNIFICANT CHANGE UP (ref 12–16)
INR BLD: 1.12 RATIO — SIGNIFICANT CHANGE UP (ref 0.88–1.16)
LACTATE BLDV-MCNC: 1 MMOL/L — SIGNIFICANT CHANGE UP (ref 0.5–2)
LYMPHOCYTES # BLD AUTO: 1 K/UL — SIGNIFICANT CHANGE UP (ref 1–4.8)
LYMPHOCYTES # BLD AUTO: 12.5 % — LOW (ref 20–55)
MCHC RBC-ENTMCNC: 26.7 PG — LOW (ref 27–31)
MCHC RBC-ENTMCNC: 26.8 PG — LOW (ref 27–31)
MCHC RBC-ENTMCNC: 30.2 G/DL — LOW (ref 32–36)
MCHC RBC-ENTMCNC: 30.3 G/DL — LOW (ref 32–36)
MCV RBC AUTO: 87.9 FL — SIGNIFICANT CHANGE UP (ref 81–99)
MCV RBC AUTO: 88.8 FL — SIGNIFICANT CHANGE UP (ref 81–99)
MONOCYTES # BLD AUTO: 0.4 K/UL — SIGNIFICANT CHANGE UP (ref 0–0.8)
MONOCYTES NFR BLD AUTO: 5.2 % — SIGNIFICANT CHANGE UP (ref 3–10)
NEUTROPHILS # BLD AUTO: 6.6 K/UL — SIGNIFICANT CHANGE UP (ref 1.8–8)
NEUTROPHILS NFR BLD AUTO: 81.7 % — HIGH (ref 37–73)
PLATELET # BLD AUTO: 209 K/UL — SIGNIFICANT CHANGE UP (ref 150–400)
PLATELET # BLD AUTO: 214 K/UL — SIGNIFICANT CHANGE UP (ref 150–400)
POTASSIUM SERPL-MCNC: 4 MMOL/L — SIGNIFICANT CHANGE UP (ref 3.5–5.3)
POTASSIUM SERPL-SCNC: 4 MMOL/L — SIGNIFICANT CHANGE UP (ref 3.5–5.3)
PROT SERPL-MCNC: 8.5 G/DL — SIGNIFICANT CHANGE UP (ref 6.6–8.7)
PROTHROM AB SERPL-ACNC: 12.3 SEC — SIGNIFICANT CHANGE UP (ref 9.8–12.7)
RBC # BLD: 4.74 M/UL — SIGNIFICANT CHANGE UP (ref 4.4–5.2)
RBC # BLD: 4.95 M/UL — SIGNIFICANT CHANGE UP (ref 4.4–5.2)
RBC # FLD: 16.2 % — HIGH (ref 11–15.6)
RBC # FLD: 16.4 % — HIGH (ref 11–15.6)
SODIUM SERPL-SCNC: 137 MMOL/L — SIGNIFICANT CHANGE UP (ref 135–145)
SPECIMEN SOURCE: SIGNIFICANT CHANGE UP
TROPONIN T SERPL-MCNC: <0.01 NG/ML — SIGNIFICANT CHANGE UP (ref 0–0.06)
WBC # BLD: 7.1 K/UL — SIGNIFICANT CHANGE UP (ref 4.8–10.8)
WBC # BLD: 8 K/UL — SIGNIFICANT CHANGE UP (ref 4.8–10.8)
WBC # FLD AUTO: 7.1 K/UL — SIGNIFICANT CHANGE UP (ref 4.8–10.8)
WBC # FLD AUTO: 8 K/UL — SIGNIFICANT CHANGE UP (ref 4.8–10.8)

## 2018-08-31 PROCEDURE — 99223 1ST HOSP IP/OBS HIGH 75: CPT

## 2018-08-31 PROCEDURE — 71046 X-RAY EXAM CHEST 2 VIEWS: CPT | Mod: 26

## 2018-08-31 PROCEDURE — 93970 EXTREMITY STUDY: CPT | Mod: 26

## 2018-08-31 PROCEDURE — 93010 ELECTROCARDIOGRAM REPORT: CPT

## 2018-08-31 PROCEDURE — 71275 CT ANGIOGRAPHY CHEST: CPT | Mod: 26

## 2018-08-31 PROCEDURE — 99285 EMERGENCY DEPT VISIT HI MDM: CPT | Mod: 25

## 2018-08-31 RX ORDER — OXYCODONE HYDROCHLORIDE 5 MG/1
10 TABLET ORAL EVERY 4 HOURS
Qty: 0 | Refills: 0 | Status: DISCONTINUED | OUTPATIENT
Start: 2018-08-31 | End: 2018-09-01

## 2018-08-31 RX ORDER — OXYCODONE HYDROCHLORIDE 5 MG/1
5 TABLET ORAL EVERY 4 HOURS
Qty: 0 | Refills: 0 | Status: DISCONTINUED | OUTPATIENT
Start: 2018-08-31 | End: 2018-09-01

## 2018-08-31 RX ORDER — KETOROLAC TROMETHAMINE 30 MG/ML
30 SYRINGE (ML) INJECTION ONCE
Qty: 0 | Refills: 0 | Status: DISCONTINUED | OUTPATIENT
Start: 2018-08-31 | End: 2018-08-31

## 2018-08-31 RX ORDER — HYDROMORPHONE HYDROCHLORIDE 2 MG/ML
0.5 INJECTION INTRAMUSCULAR; INTRAVENOUS; SUBCUTANEOUS EVERY 4 HOURS
Qty: 0 | Refills: 0 | Status: DISCONTINUED | OUTPATIENT
Start: 2018-08-31 | End: 2018-09-04

## 2018-08-31 RX ORDER — HEPARIN SODIUM 5000 [USP'U]/ML
INJECTION INTRAVENOUS; SUBCUTANEOUS
Qty: 25000 | Refills: 0 | Status: DISCONTINUED | OUTPATIENT
Start: 2018-08-31 | End: 2018-09-01

## 2018-08-31 RX ORDER — HEPARIN SODIUM 5000 [USP'U]/ML
7500 INJECTION INTRAVENOUS; SUBCUTANEOUS EVERY 6 HOURS
Qty: 0 | Refills: 0 | Status: DISCONTINUED | OUTPATIENT
Start: 2018-08-31 | End: 2018-09-02

## 2018-08-31 RX ORDER — MORPHINE SULFATE 50 MG/1
2 CAPSULE, EXTENDED RELEASE ORAL EVERY 6 HOURS
Qty: 0 | Refills: 0 | Status: DISCONTINUED | OUTPATIENT
Start: 2018-08-31 | End: 2018-08-31

## 2018-08-31 RX ORDER — AZITHROMYCIN 500 MG/1
500 TABLET, FILM COATED ORAL ONCE
Qty: 0 | Refills: 0 | Status: COMPLETED | OUTPATIENT
Start: 2018-08-31 | End: 2018-08-31

## 2018-08-31 RX ORDER — HEPARIN SODIUM 5000 [USP'U]/ML
6500 INJECTION INTRAVENOUS; SUBCUTANEOUS ONCE
Qty: 0 | Refills: 0 | Status: DISCONTINUED | OUTPATIENT
Start: 2018-08-31 | End: 2018-08-31

## 2018-08-31 RX ORDER — HEPARIN SODIUM 5000 [USP'U]/ML
3500 INJECTION INTRAVENOUS; SUBCUTANEOUS EVERY 6 HOURS
Qty: 0 | Refills: 0 | Status: DISCONTINUED | OUTPATIENT
Start: 2018-08-31 | End: 2018-09-02

## 2018-08-31 RX ORDER — OXYCODONE AND ACETAMINOPHEN 5; 325 MG/1; MG/1
2 TABLET ORAL EVERY 6 HOURS
Qty: 0 | Refills: 0 | Status: DISCONTINUED | OUTPATIENT
Start: 2018-08-31 | End: 2018-08-31

## 2018-08-31 RX ORDER — HYDROMORPHONE HYDROCHLORIDE 2 MG/ML
1 INJECTION INTRAMUSCULAR; INTRAVENOUS; SUBCUTANEOUS ONCE
Qty: 0 | Refills: 0 | Status: DISCONTINUED | OUTPATIENT
Start: 2018-08-31 | End: 2018-08-31

## 2018-08-31 RX ORDER — HEPARIN SODIUM 5000 [USP'U]/ML
6500 INJECTION INTRAVENOUS; SUBCUTANEOUS EVERY 6 HOURS
Qty: 0 | Refills: 0 | Status: DISCONTINUED | OUTPATIENT
Start: 2018-08-31 | End: 2018-08-31

## 2018-08-31 RX ORDER — HEPARIN SODIUM 5000 [USP'U]/ML
7500 INJECTION INTRAVENOUS; SUBCUTANEOUS ONCE
Qty: 0 | Refills: 0 | Status: COMPLETED | OUTPATIENT
Start: 2018-08-31 | End: 2018-08-31

## 2018-08-31 RX ORDER — MORPHINE SULFATE 50 MG/1
2 CAPSULE, EXTENDED RELEASE ORAL ONCE
Qty: 0 | Refills: 0 | Status: DISCONTINUED | OUTPATIENT
Start: 2018-08-31 | End: 2018-08-31

## 2018-08-31 RX ORDER — OXYCODONE AND ACETAMINOPHEN 5; 325 MG/1; MG/1
1 TABLET ORAL EVERY 6 HOURS
Qty: 0 | Refills: 0 | Status: DISCONTINUED | OUTPATIENT
Start: 2018-08-31 | End: 2018-08-31

## 2018-08-31 RX ORDER — HYDROMORPHONE HYDROCHLORIDE 2 MG/ML
0.5 INJECTION INTRAMUSCULAR; INTRAVENOUS; SUBCUTANEOUS EVERY 4 HOURS
Qty: 0 | Refills: 0 | Status: DISCONTINUED | OUTPATIENT
Start: 2018-08-31 | End: 2018-08-31

## 2018-08-31 RX ORDER — HEPARIN SODIUM 5000 [USP'U]/ML
INJECTION INTRAVENOUS; SUBCUTANEOUS
Qty: 25000 | Refills: 0 | Status: DISCONTINUED | OUTPATIENT
Start: 2018-08-31 | End: 2018-08-31

## 2018-08-31 RX ORDER — PANTOPRAZOLE SODIUM 20 MG/1
40 TABLET, DELAYED RELEASE ORAL
Qty: 0 | Refills: 0 | Status: DISCONTINUED | OUTPATIENT
Start: 2018-08-31 | End: 2018-09-04

## 2018-08-31 RX ORDER — HEPARIN SODIUM 5000 [USP'U]/ML
3000 INJECTION INTRAVENOUS; SUBCUTANEOUS EVERY 6 HOURS
Qty: 0 | Refills: 0 | Status: DISCONTINUED | OUTPATIENT
Start: 2018-08-31 | End: 2018-08-31

## 2018-08-31 RX ORDER — CEFTRIAXONE 500 MG/1
1 INJECTION, POWDER, FOR SOLUTION INTRAMUSCULAR; INTRAVENOUS ONCE
Qty: 0 | Refills: 0 | Status: COMPLETED | OUTPATIENT
Start: 2018-08-31 | End: 2018-08-31

## 2018-08-31 RX ORDER — ONDANSETRON 8 MG/1
4 TABLET, FILM COATED ORAL EVERY 6 HOURS
Qty: 0 | Refills: 0 | Status: DISCONTINUED | OUTPATIENT
Start: 2018-08-31 | End: 2018-09-04

## 2018-08-31 RX ADMIN — Medication 30 MILLIGRAM(S): at 08:35

## 2018-08-31 RX ADMIN — MORPHINE SULFATE 2 MILLIGRAM(S): 50 CAPSULE, EXTENDED RELEASE ORAL at 14:40

## 2018-08-31 RX ADMIN — HYDROMORPHONE HYDROCHLORIDE 1 MILLIGRAM(S): 2 INJECTION INTRAMUSCULAR; INTRAVENOUS; SUBCUTANEOUS at 15:14

## 2018-08-31 RX ADMIN — HEPARIN SODIUM 7500 UNIT(S): 5000 INJECTION INTRAVENOUS; SUBCUTANEOUS at 11:37

## 2018-08-31 RX ADMIN — OXYCODONE AND ACETAMINOPHEN 2 TABLET(S): 5; 325 TABLET ORAL at 13:34

## 2018-08-31 RX ADMIN — OXYCODONE HYDROCHLORIDE 10 MILLIGRAM(S): 5 TABLET ORAL at 22:25

## 2018-08-31 RX ADMIN — AZITHROMYCIN 500 MILLIGRAM(S): 500 TABLET, FILM COATED ORAL at 09:14

## 2018-08-31 RX ADMIN — HEPARIN SODIUM 0 UNIT(S)/HR: 5000 INJECTION INTRAVENOUS; SUBCUTANEOUS at 20:04

## 2018-08-31 RX ADMIN — AZITHROMYCIN 255 MILLIGRAM(S): 500 TABLET, FILM COATED ORAL at 08:14

## 2018-08-31 RX ADMIN — ONDANSETRON 4 MILLIGRAM(S): 8 TABLET, FILM COATED ORAL at 20:07

## 2018-08-31 RX ADMIN — HEPARIN SODIUM 1700 UNIT(S)/HR: 5000 INJECTION INTRAVENOUS; SUBCUTANEOUS at 11:37

## 2018-08-31 RX ADMIN — Medication 30 MILLIGRAM(S): at 08:14

## 2018-08-31 RX ADMIN — HYDROMORPHONE HYDROCHLORIDE 0.5 MILLIGRAM(S): 2 INJECTION INTRAMUSCULAR; INTRAVENOUS; SUBCUTANEOUS at 18:45

## 2018-08-31 RX ADMIN — CEFTRIAXONE 100 GRAM(S): 500 INJECTION, POWDER, FOR SOLUTION INTRAMUSCULAR; INTRAVENOUS at 10:33

## 2018-08-31 RX ADMIN — OXYCODONE HYDROCHLORIDE 10 MILLIGRAM(S): 5 TABLET ORAL at 23:25

## 2018-08-31 NOTE — ED ADULT TRIAGE NOTE - CHIEF COMPLAINT QUOTE
Patient A&Ox4 complaining of shortness of breath when she takes a deep breath. Stated was evaluated in ED yesterday, Dx with PNA in right lung. Patient tearful, stated received antibiotics & was D/C'd.

## 2018-08-31 NOTE — H&P ADULT - ASSESSMENT
> Acute PE without cor pulmonale - pt with history c/w immobility for prolonged periods.  Discussed sleeping arrangements, as she is homeless. TTE to assess RV.  Hematology evaluation for hypercoagulable workup.  Continue anticoagulation.  > Smoker - Nicotine Patch.  I strongly encouraged the patient to quit smoking.  I outlined the extensive negative impact of smoking on quality of life and the numerous consequences of continued smoking including lung disease, heart attack, stroke, vascular disease, malignancy, increased mortality, etc.  The patient understood these effects and agreed on smoking cessation.  > R breast skin thickening with b/l mild axillary adenopathy - Offered pt breast exam in presence of female RN.  She refuses.  discussed need for outpatient f/u for exam and mammogram.  the possibility of malignancy was discussed and acknowledged by patient.  > Thyroid nodule - as above, discussed need for endocrine evaluation as outpatient as she did not wish for further inpatient workup.  Pt acknowledged understanding.  Check TFTs.

## 2018-08-31 NOTE — ED PROVIDER NOTE - SECONDARY DIAGNOSIS.
Pacemaker Pacemaker HTN (hypertension) HTN (hypertension) Essential hypertension Pacemaker Acute respiratory failure with hypoxia

## 2018-08-31 NOTE — H&P ADULT - HISTORY OF PRESENT ILLNESS
38y homeless female PMH GERD presents c/o R pleuritic chest pain and mild x 2 days, sudden onset, worse with deep inspiration.  No cough / fever / chills.  No hemoptysis.  She had been treated 1 day ago for possible pneumonia and started on po antibiotics.  She returned with worsening symptoms, found to be hypoxic, SaO2 92% on RA, CT chest showing Right lower lobe segmental pulmonary embolism with associated pulmonary infarct. No right heart strain.  Pt admits to sleeping seated in car, has been experiencing intermittent b/l LE swelling when she wakes up over past 2 months.  Denies OCP use.  Symptoms improving in ED.      FAMILY HISTORY: reviewed and negative.  SOCIAL HISTORY: - alcohol, - IVDA, + smoking 1/2 PPD  REVIEW OF SYSTEMS: General: - fatigue, - weight loss, - fevers, - chills.  HEENT: - headache, - hearing disturbances.  EYES: - visual disturbances, - diplopia.  CARDIOVASCULAR: + chest pain, - palpitations.  PULMONARY: + SOB, - cough, - hemoptysis.  GI: - abdominal pain, - nausea, - vomiting, - diarrhea, - constipation.  : - urinary urgency, - frequency, - dysuria.  MUSCULOSKELETAL: - arthralgias, - myalgias.  NEURO:  - weakness, - numbness.  PSYCH: - depression, - anxiety, - suicidal thoughts. SKIN: - rashes, - lesions.  All remaining ROS are negative.Allergies    No Known Allergies    Intolerances

## 2018-08-31 NOTE — CONSULT NOTE ADULT - SUBJECTIVE AND OBJECTIVE BOX
PULMONARY CONSULT NOTE      BIANKA ANDRE-[id number]    Patient is a 38y old  Female who presents with a chief complaint of SOB, Chest pain    INTERVAL HPI/OVERNIGHT EVENTS:38 year old female with PMH PUD presents with R flank/lower chest pain and SOB. Sx started 1 day ago, constant R lower chest/flank pain, sharp, worse with deep inspiration. Pt was seen here yesterday, had CT renal, which showed RLL pna, and was discharged, however her Sx have continued to worsen. + non-productive cough, but no vomiting, diarrhea, dysuria, hematuria.  Pt is homeless, sleeps in her car.  Has been complaining of leg discomfort and swelling bilat.    MEDICATIONS  (STANDING):  cefTRIAXone   IVPB 1 Gram(s) IV Intermittent Once  heparin  Infusion.  Unit(s)/Hr (14 mL/Hr) IV Continuous <Continuous>  heparin  Injectable 6500 Unit(s) IV Push once      MEDICATIONS  (PRN):  heparin  Injectable 6500 Unit(s) IV Push every 6 hours PRN For aPTT less than 40  heparin  Injectable 3000 Unit(s) IV Push every 6 hours PRN For aPTT between 40 - 57      Allergies    No Known Allergies    Intolerances        PAST MEDICAL & SURGICAL HISTORY:  Peptic ulcer  Gastritis  H/O hernia repair: 2015      FAMILY HISTORY:  Neg    SOCIAL HISTORY  Smoking History: < 1 ppd    REVIEW OF SYSTEMS:    CONSTITUTIONAL:  As per HPI.    HEENT:  Eyes:  No diplopia or blurred vision. ENT:  No earache, sore throat or runny nose.    CARDIOVASCULAR:  No pressure, squeezing, tightness, heaviness or aching about the chest; no palpitations.    RESPIRATORY:  Per HPI    GASTROINTESTINAL:  No nausea, vomiting or diarrhea.    GENITOURINARY:  No dysuria, frequency or urgency.    MUSCULOSKELETAL:  No joint pains    SKIN:  No new lesions.    NEUROLOGIC:  No paresthesias, fasciculations, seizures or weakness.    PSYCHIATRIC:  No disorder of thought or mood.    ENDOCRINE:  No heat or cold intolerance, polyuria or polydipsia.    HEMATOLOGICAL:  No easy bruising or bleeding.     Vital Signs Last 24 Hrs  T(C): 36.7 (31 Aug 2018 04:47), Max: 36.7 (31 Aug 2018 04:47)  T(F): 98.1 (31 Aug 2018 04:47), Max: 98.1 (31 Aug 2018 04:47)  HR: 86 (31 Aug 2018 04:47) (86 - 86)  BP: 130/86 (31 Aug 2018 04:47) (130/86 - 130/86)  BP(mean): --  RR: 20 (31 Aug 2018 04:47) (20 - 20)  SpO2: 94% (31 Aug 2018 04:47) (94% - 94%)    PHYSICAL EXAMINATION:    GENERAL: The patient is a well-developed, well-nourished BF_____in no apparent distress.     HEENT: Head is normocephalic and atraumatic. Extraocular muscles are intact. Mucous membranes are moist.     NECK: Supple.     LUNGS: diminished bs R base    HEART: Regular rate and rhythm without murmur.    ABDOMEN: Soft, nontender, and nondistended.  No hepatosplenomegaly is noted.    EXTREMITIES: Without any cyanosis, clubbing, rash, lesions or edema.    NEUROLOGIC: Grossly intact.    SKIN: No ulceration or induration present.      LABS:                        13.2   8.0   )-----------( 209      ( 31 Aug 2018 08:34 )             43.5         137  |  101  |  7.0<L>  ----------------------------<  114  4.0   |  23.0  |  0.50    Ca    8.9      31 Aug 2018 08:34    TPro  8.5  /  Alb  3.8  /  TBili  0.7  /  DBili  x   /  AST  17  /  ALT  14  /  AlkPhos  96        Urinalysis Basic - ( 30 Aug 2018 02:17 )    Color: Yellow / Appearance: very cloudy / S.025 / pH: x  Gluc: x / Ketone: Trace  / Bili: Negative / Urobili: 4 mg/dL   Blood: x / Protein: 30 mg/dL / Nitrite: Negative   Leuk Esterase: Trace / RBC: 6-10 /HPF / WBC 0-2   Sq Epi: x / Non Sq Epi: Occasional / Bacteria: Moderate        CARDIAC MARKERS ( 31 Aug 2018 08:34 )  x     / <0.01 ng/mL / x     / x     / x                    MICROBIOLOGY:    RADIOLOGY & ADDITIONAL STUDIES:< from: CT Angio Chest w/ IV Cont (18 @ 08:40) >  CHEST:     LUNGS AND LARGE AIRWAYS: Patent central airways.  Right lower lobe   pulmonary infarct.  PLEURA: No pleural effusion.  VESSELS: Right lower lobe segmental pulmonary embolism. No right heart   strain.  HEART: Heart size is normal. No pericardial effusion.  MEDIASTINUM AND ELYSIA: No lymphadenopathy.  CHEST WALL AND LOWER NECK: Right thyroid nodule. Outpatient thyroid   ultrasound is recommended.  VISUALIZED UPPER ABDOMEN: Skin thickening right breast. Mild bilateral   axillary adenopathy, mammographic follow-up is recommended. 0.7 cm nodule   posterior to the right thyroid gland, correlate for parathyroid lesion.   Overall endocrine consultation may be considered.  BONES: Within normal limits.    IMPRESSION: Right lower lobe segmental pulmonary embolism with associated   pulmonary infarct. No right heart strain. Other incidental comments for   follow-up as above.    < end of copied text >

## 2018-08-31 NOTE — H&P ADULT - NSHPPHYSICALEXAM_GEN_ALL_CORE
PHYSICAL EXAMINATION:  GENERAL: NAD, Alert and Oriented x 3 HEENT:  Normocephalic and atraumatic.  Extraocular muscles are intact.  NECK: Supple.  - JVD.  CARDIOVASCULAR: RRR S1, S2.  LUNGS: CTAB, - rales, - wheezing, - rhonchi.  BACK: - CVA tenderness.  ABDOMEN: Soft, - tenderness, - distension, + BS.  EXTREMITIES: - cyanosis, - clubbing, - edema.  NEUROLOGIC: strength is symmetric, sensation intact, speech fluent.  PSYCHIATRIC: Calm.  - agitation.    SKIN: - rashes, - lesions.  refused breast exam

## 2018-08-31 NOTE — CONSULT NOTE ADULT - ASSESSMENT
Imp--PE with infarct.  Pts living arrangement c/w long car trip.  R/O DVT.  Also breast skin thickening, ?parathyroid nodule--may make her hypercoagulable.    Plan  anticoag, leg duplex.  Hypercoag w/u  Soc services.

## 2018-08-31 NOTE — ED ADULT NURSE REASSESSMENT NOTE - NS ED NURSE REASSESS COMMENT FT1
Patient A&OX3, heparin in progress at this time. No S&S of bleeding at this time. VSS. CM in place. NSR. report given to ANGELA Pina.

## 2018-08-31 NOTE — ED PROVIDER NOTE - CARE PLAN
Principal Discharge DX:	Other acute pulmonary embolism without acute cor pulmonale  Secondary Diagnosis:	Acute respiratory failure with hypoxia

## 2018-08-31 NOTE — ED ADULT NURSE NOTE - OBJECTIVE STATEMENT
Patient A&Ox4, c/o SOB and back pain at this time. Pt states that she was evaluated in ED yesterday, Dx with Pneumonia to the right lung. VSS. CM in place. NSR.

## 2018-08-31 NOTE — H&P ADULT - NSHPLABSRESULTS_GEN_ALL_CORE
VITALS:  Vital Signs Last 24 Hrs  T(C): 36.6 (31 Aug 2018 09:23), Max: 36.7 (31 Aug 2018 04:47)  T(F): 97.8 (31 Aug 2018 09:23), Max: 98.1 (31 Aug 2018 04:47)  HR: 94 (31 Aug 2018 09:23) (86 - 94)  BP: 134/84 (31 Aug 2018 09:23) (130/86 - 134/84)  BP(mean): --  RR: 20 (31 Aug 2018 09:23) (20 - 20)  SpO2: 98% (31 Aug 2018 09:23) (94% - 98%) Daily Height in cm: 167.64 (31 Aug 2018 04:42)    Daily   CAPILLARY BLOOD GLUCOSE        I&O's Summary      LABS:                        13.2   8.0   )-----------( 209      ( 31 Aug 2018 08:34 )             43.5         137  |  101  |  7.0<L>  ----------------------------<  114  4.0   |  23.0  |  0.50    Ca    8.9      31 Aug 2018 08:34    TPro  8.5  /  Alb  3.8  /  TBili  0.7  /  DBili  x   /  AST  17  /  ALT  14  /  AlkPhos  96      PT/INR - ( 31 Aug 2018 11:11 )   PT: 12.3 sec;   INR: 1.12 ratio         PTT - ( 31 Aug 2018 11:11 )  PTT:26.0 sec  LIVER FUNCTIONS - ( 31 Aug 2018 08:34 )  Alb: 3.8 g/dL / Pro: 8.5 g/dL / ALK PHOS: 96 U/L / ALT: 14 U/L / AST: 17 U/L / GGT: x           Urinalysis Basic - ( 30 Aug 2018 02:17 )    Color: Yellow / Appearance: very cloudy / S.025 / pH: x  Gluc: x / Ketone: Trace  / Bili: Negative / Urobili: 4 mg/dL   Blood: x / Protein: 30 mg/dL / Nitrite: Negative   Leuk Esterase: Trace / RBC: 6-10 /HPF / WBC 0-2   Sq Epi: x / Non Sq Epi: Occasional / Bacteria: Moderate      CARDIAC MARKERS ( 31 Aug 2018 08:34 )  x     / <0.01 ng/mL / x     / x     / x        < from: CT Angio Chest w/ IV Cont (18 @ 08:40) >    PROCEDURE:   CT Angiography of the Chest.  90 ml of Omnipaque 350 was injected intravenously. 10 ml were discarded.  Sagittal and coronal reformats were performed as well as 3D (MIP)   reconstructions.    FINDINGS:    CHEST:     LUNGS AND LARGE AIRWAYS: Patent central airways.  Right lower lobe   pulmonary infarct.  PLEURA: No pleural effusion.  VESSELS: Right lower lobe segmental pulmonary embolism. No right heart   strain.  HEART: Heart size is normal. No pericardial effusion.  MEDIASTINUM AND ELYSIA: No lymphadenopathy.  CHEST WALL AND LOWER NECK: Right thyroid nodule. Outpatient thyroid   ultrasound is recommended.  VISUALIZED UPPER ABDOMEN: Skin thickening right breast. Mild bilateral   axillary adenopathy, mammographic follow-up is recommended. 0.7 cm nodule   posterior to the right thyroid gland, correlate for parathyroid lesion.   Overall endocrine consultation may be considered.  BONES: Within normal limits.    IMPRESSION: Right lower lobe segmental pulmonary embolism with associated   pulmonary infarct. No right heart strain. Other incidental comments for   follow-up as above.    Findings were discussed with Physician: JOHN HOOK [phone]   with a read back at 8:54 AM.      < end of copied text >

## 2018-08-31 NOTE — ED PROVIDER NOTE - OBJECTIVE STATEMENT
38 year old female with PMH PUD presents with R flank/lower chest pain and SOB. Sx started 1 day ago, constant R lower chest/flank pain, sharp, worse with deep inspiration. Pt was seen here yesterday, had CT renal, which showed RLL pna, and was discharged, however her Sx have continued to worsen. + non-productive cough, but no vomiting, diarrhea, dysuria, hematuria.

## 2018-09-01 LAB
ANION GAP SERPL CALC-SCNC: 11 MMOL/L — SIGNIFICANT CHANGE UP (ref 5–17)
APTT BLD: 52.7 SEC — HIGH (ref 27.5–37.4)
APTT BLD: 61.8 SEC — HIGH (ref 27.5–37.4)
APTT BLD: 63 SEC — HIGH (ref 27.5–37.4)
BUN SERPL-MCNC: 5 MG/DL — LOW (ref 8–20)
CALCIUM SERPL-MCNC: 8.9 MG/DL — SIGNIFICANT CHANGE UP (ref 8.6–10.2)
CHLORIDE SERPL-SCNC: 101 MMOL/L — SIGNIFICANT CHANGE UP (ref 98–107)
CO2 SERPL-SCNC: 25 MMOL/L — SIGNIFICANT CHANGE UP (ref 22–29)
CREAT SERPL-MCNC: 0.44 MG/DL — LOW (ref 0.5–1.3)
GLUCOSE SERPL-MCNC: 118 MG/DL — HIGH (ref 70–115)
HCT VFR BLD CALC: 37.9 % — SIGNIFICANT CHANGE UP (ref 37–47)
HCT VFR BLD CALC: 39.2 % — SIGNIFICANT CHANGE UP (ref 37–47)
HGB BLD-MCNC: 11.4 G/DL — LOW (ref 12–16)
HGB BLD-MCNC: 11.7 G/DL — LOW (ref 12–16)
MCHC RBC-ENTMCNC: 26.8 PG — LOW (ref 27–31)
MCHC RBC-ENTMCNC: 26.9 PG — LOW (ref 27–31)
MCHC RBC-ENTMCNC: 29.8 G/DL — LOW (ref 32–36)
MCHC RBC-ENTMCNC: 30.1 G/DL — LOW (ref 32–36)
MCV RBC AUTO: 89.2 FL — SIGNIFICANT CHANGE UP (ref 81–99)
MCV RBC AUTO: 90.1 FL — SIGNIFICANT CHANGE UP (ref 81–99)
PLATELET # BLD AUTO: 193 K/UL — SIGNIFICANT CHANGE UP (ref 150–400)
PLATELET # BLD AUTO: 211 K/UL — SIGNIFICANT CHANGE UP (ref 150–400)
POTASSIUM SERPL-MCNC: 4.1 MMOL/L — SIGNIFICANT CHANGE UP (ref 3.5–5.3)
POTASSIUM SERPL-SCNC: 4.1 MMOL/L — SIGNIFICANT CHANGE UP (ref 3.5–5.3)
RBC # BLD: 4.25 M/UL — LOW (ref 4.4–5.2)
RBC # BLD: 4.35 M/UL — LOW (ref 4.4–5.2)
RBC # FLD: 16.2 % — HIGH (ref 11–15.6)
RBC # FLD: 16.3 % — HIGH (ref 11–15.6)
SODIUM SERPL-SCNC: 137 MMOL/L — SIGNIFICANT CHANGE UP (ref 135–145)
WBC # BLD: 9 K/UL — SIGNIFICANT CHANGE UP (ref 4.8–10.8)
WBC # BLD: 9.1 K/UL — SIGNIFICANT CHANGE UP (ref 4.8–10.8)
WBC # FLD AUTO: 9 K/UL — SIGNIFICANT CHANGE UP (ref 4.8–10.8)
WBC # FLD AUTO: 9.1 K/UL — SIGNIFICANT CHANGE UP (ref 4.8–10.8)

## 2018-09-01 PROCEDURE — 71046 X-RAY EXAM CHEST 2 VIEWS: CPT | Mod: 26

## 2018-09-01 PROCEDURE — 99233 SBSQ HOSP IP/OBS HIGH 50: CPT

## 2018-09-01 RX ORDER — HEPARIN SODIUM 5000 [USP'U]/ML
1400 INJECTION INTRAVENOUS; SUBCUTANEOUS
Qty: 25000 | Refills: 0 | Status: DISCONTINUED | OUTPATIENT
Start: 2018-09-01 | End: 2018-09-02

## 2018-09-01 RX ORDER — DOCUSATE SODIUM 100 MG
100 CAPSULE ORAL DAILY
Qty: 0 | Refills: 0 | Status: DISCONTINUED | OUTPATIENT
Start: 2018-09-01 | End: 2018-09-04

## 2018-09-01 RX ORDER — OXYCODONE HYDROCHLORIDE 5 MG/1
15 TABLET ORAL EVERY 4 HOURS
Qty: 0 | Refills: 0 | Status: DISCONTINUED | OUTPATIENT
Start: 2018-09-01 | End: 2018-09-04

## 2018-09-01 RX ORDER — NICOTINE POLACRILEX 2 MG
1 GUM BUCCAL DAILY
Qty: 0 | Refills: 0 | Status: DISCONTINUED | OUTPATIENT
Start: 2018-09-01 | End: 2018-09-04

## 2018-09-01 RX ORDER — ACETAMINOPHEN 500 MG
650 TABLET ORAL EVERY 6 HOURS
Qty: 0 | Refills: 0 | Status: DISCONTINUED | OUTPATIENT
Start: 2018-09-01 | End: 2018-09-04

## 2018-09-01 RX ORDER — HYDROMORPHONE HYDROCHLORIDE 2 MG/ML
1 INJECTION INTRAMUSCULAR; INTRAVENOUS; SUBCUTANEOUS ONCE
Qty: 0 | Refills: 0 | Status: DISCONTINUED | OUTPATIENT
Start: 2018-09-01 | End: 2018-09-01

## 2018-09-01 RX ORDER — OXYCODONE HYDROCHLORIDE 5 MG/1
10 TABLET ORAL EVERY 4 HOURS
Qty: 0 | Refills: 0 | Status: DISCONTINUED | OUTPATIENT
Start: 2018-09-01 | End: 2018-09-04

## 2018-09-01 RX ADMIN — HYDROMORPHONE HYDROCHLORIDE 0.5 MILLIGRAM(S): 2 INJECTION INTRAMUSCULAR; INTRAVENOUS; SUBCUTANEOUS at 21:56

## 2018-09-01 RX ADMIN — ONDANSETRON 4 MILLIGRAM(S): 8 TABLET, FILM COATED ORAL at 22:23

## 2018-09-01 RX ADMIN — HYDROMORPHONE HYDROCHLORIDE 0.5 MILLIGRAM(S): 2 INJECTION INTRAMUSCULAR; INTRAVENOUS; SUBCUTANEOUS at 00:17

## 2018-09-01 RX ADMIN — HEPARIN SODIUM 1400 UNIT(S)/HR: 5000 INJECTION INTRAVENOUS; SUBCUTANEOUS at 03:32

## 2018-09-01 RX ADMIN — PANTOPRAZOLE SODIUM 40 MILLIGRAM(S): 20 TABLET, DELAYED RELEASE ORAL at 06:30

## 2018-09-01 RX ADMIN — Medication 100 MILLIGRAM(S): at 20:55

## 2018-09-01 RX ADMIN — Medication 1 PATCH: at 14:45

## 2018-09-01 RX ADMIN — OXYCODONE HYDROCHLORIDE 15 MILLIGRAM(S): 5 TABLET ORAL at 13:22

## 2018-09-01 RX ADMIN — Medication 650 MILLIGRAM(S): at 13:22

## 2018-09-01 RX ADMIN — HEPARIN SODIUM 1400 UNIT(S)/HR: 5000 INJECTION INTRAVENOUS; SUBCUTANEOUS at 10:22

## 2018-09-01 RX ADMIN — HYDROMORPHONE HYDROCHLORIDE 0.5 MILLIGRAM(S): 2 INJECTION INTRAMUSCULAR; INTRAVENOUS; SUBCUTANEOUS at 00:02

## 2018-09-01 RX ADMIN — HYDROMORPHONE HYDROCHLORIDE 0.5 MILLIGRAM(S): 2 INJECTION INTRAMUSCULAR; INTRAVENOUS; SUBCUTANEOUS at 21:41

## 2018-09-01 RX ADMIN — HYDROMORPHONE HYDROCHLORIDE 0.5 MILLIGRAM(S): 2 INJECTION INTRAMUSCULAR; INTRAVENOUS; SUBCUTANEOUS at 09:54

## 2018-09-01 RX ADMIN — OXYCODONE HYDROCHLORIDE 15 MILLIGRAM(S): 5 TABLET ORAL at 18:28

## 2018-09-01 RX ADMIN — Medication 650 MILLIGRAM(S): at 17:11

## 2018-09-01 RX ADMIN — HYDROMORPHONE HYDROCHLORIDE 1 MILLIGRAM(S): 2 INJECTION INTRAMUSCULAR; INTRAVENOUS; SUBCUTANEOUS at 05:07

## 2018-09-01 RX ADMIN — HYDROMORPHONE HYDROCHLORIDE 1 MILLIGRAM(S): 2 INJECTION INTRAMUSCULAR; INTRAVENOUS; SUBCUTANEOUS at 04:52

## 2018-09-01 RX ADMIN — HYDROMORPHONE HYDROCHLORIDE 0.5 MILLIGRAM(S): 2 INJECTION INTRAMUSCULAR; INTRAVENOUS; SUBCUTANEOUS at 09:26

## 2018-09-01 RX ADMIN — OXYCODONE HYDROCHLORIDE 15 MILLIGRAM(S): 5 TABLET ORAL at 17:41

## 2018-09-01 RX ADMIN — HEPARIN SODIUM 3500 UNIT(S): 5000 INJECTION INTRAVENOUS; SUBCUTANEOUS at 18:33

## 2018-09-01 RX ADMIN — HEPARIN SODIUM 1600 UNIT(S)/HR: 5000 INJECTION INTRAVENOUS; SUBCUTANEOUS at 18:30

## 2018-09-01 RX ADMIN — OXYCODONE HYDROCHLORIDE 15 MILLIGRAM(S): 5 TABLET ORAL at 13:46

## 2018-09-01 NOTE — PROGRESS NOTE ADULT - ASSESSMENT
> Acute PE without cor pulmonale - pt with history c/w immobility for prolonged periods.  Discussed sleeping arrangements, as she is homeless. TTE to assess RV.  Discussed with Dr. Caldera - agreed to see pt as outpatient.  Hypercoagulable workup ordered as recommended.    > Pulmonary Infarct - repeat CXR.  SaO2 improving, 97% on 3L nc.  Pain control including Oxycodone, Dilaudid.  Add Tylenol RTC.   > Smoker - Nicotine Patch.  I strongly encouraged the patient to quit smoking.  I outlined the extensive negative impact of smoking on quality of life and the numerous consequences of continued smoking including lung disease, heart attack, stroke, vascular disease, malignancy, increased mortality, etc.  The patient understood these effects and agreed on smoking cessation.  > R breast skin thickening with b/l mild axillary adenopathy - Offered pt breast exam in presence of female RN.  She refuses.  discussed need for outpatient f/u for exam and mammogram.  the possibility of malignancy was discussed and acknowledged by patient.  > Thyroid nodule - as above, discussed need for endocrine evaluation as outpatient as she did not wish for further inpatient workup.  Pt acknowledged understanding.  Check TFTs.

## 2018-09-01 NOTE — PROGRESS NOTE ADULT - SUBJECTIVE AND OBJECTIVE BOX
Patient: BARNEY ANDRE [id number] 38y Female                           Internal Medicine Hospitalist Progress Note  Chief Complaint: Patient is a 38y old  Female who presents with a chief complaint of chest pain (31 Aug 2018 11:25)    HPI:  38y homeless female PMH GERD presents c/o R pleuritic chest pain and mild x 2 days, sudden onset, worse with deep inspiration.  No cough / fever / chills.  No hemoptysis.  She had been treated 1 day ago for possible pneumonia and started on po antibiotics.  She returned with worsening symptoms, found to be hypoxic, SaO2 92% on RA, CT chest showing Right lower lobe segmental pulmonary embolism with associated pulmonary infarct. No right heart strain.  Pt admits to sleeping seated in car, has been experiencing intermittent b/l LE swelling when she wakes up over past 2 months.  Denies OCP use.  Symptoms improving in ED.  (31 Aug 2018 11:25)  B/l LE dopplers negative for DVT.     Pt reports continued R sided chest pain overnight, "8/10" in intensity, improving with oxycodone / dilaudid.  SOB improving.  No cough/ hemoptysis.  no fever / chills.  No leg pain.  No additional complaints.       ____________________PHYSICAL EXAM:  Vitals reviewed as indicated below  GENERAL:  NAD Alert and Oriented x 3   HEENT: NCAT  CARDIOVASCULAR:  S1, S2  LUNGS: R basilar crackles.   ABDOMEN:  soft, (-) tenderness, (-) distension, (+) bowel sounds, (-) guarding, (-) rebound (-) rigidity  EXTREMITIES:  no cyanosis / clubbing / edema.   ____________________      BACKGROUND:  HEALTH ISSUES - PROBLEM Dx:        Allergies    No Known Allergies    Intolerances      PAST MEDICAL & SURGICAL HISTORY:  Peptic ulcer  Gastritis  H/O hernia repair: 2015        VITALS:  Vital Signs Last 24 Hrs  T(C): 36.9 (01 Sep 2018 05:21), Max: 37.2 (31 Aug 2018 12:50)  T(F): 98.4 (01 Sep 2018 05:21), Max: 99 (31 Aug 2018 12:50)  HR: 95 (01 Sep 2018 05:21) (86 - 97)  BP: 115/72 (01 Sep 2018 05:21) (101/69 - 132/58)  BP(mean): --  RR: 18 (31 Aug 2018 22:20) (18 - 20)  SpO2: 99% (31 Aug 2018 22:20) (96% - 100%) Daily Height in cm: 168.91 (31 Aug 2018 18:47)    Daily   CAPILLARY BLOOD GLUCOSE        I&O's Summary    31 Aug 2018 07:01  -  01 Sep 2018 07:00  --------------------------------------------------------  IN: 240 mL / OUT: 0 mL / NET: 240 mL          LABS:                        11.7   9.1   )-----------( 211      ( 01 Sep 2018 09:48 )             39.2     09-01    137  |  101  |  5.0<L>  ----------------------------<  118<H>  4.1   |  25.0  |  0.44<L>    Ca    8.9      01 Sep 2018 07:46    TPro  8.5  /  Alb  3.8  /  TBili  0.7  /  DBili  x   /  AST  17  /  ALT  14  /  AlkPhos  96  08-31    PT/INR - ( 31 Aug 2018 11:11 )   PT: 12.3 sec;   INR: 1.12 ratio         PTT - ( 01 Sep 2018 09:48 )  PTT:61.8 sec  LIVER FUNCTIONS - ( 31 Aug 2018 08:34 )  Alb: 3.8 g/dL / Pro: 8.5 g/dL / ALK PHOS: 96 U/L / ALT: 14 U/L / AST: 17 U/L / GGT: x             CARDIAC MARKERS ( 31 Aug 2018 08:34 )  x     / <0.01 ng/mL / x     / x     / x              MEDICATIONS:  MEDICATIONS  (STANDING):  acetaminophen   Tablet 650 milliGRAM(s) Oral every 6 hours  heparin  Infusion. 1400 Unit(s)/Hr (14 mL/Hr) IV Continuous <Continuous>  pantoprazole    Tablet 40 milliGRAM(s) Oral before breakfast    MEDICATIONS  (PRN):  heparin  Injectable 7500 Unit(s) IV Push every 6 hours PRN For aPTT less than 40  heparin  Injectable 3500 Unit(s) IV Push every 6 hours PRN For aPTT between 40 - 57  HYDROmorphone  Injectable 0.5 milliGRAM(s) IV Push every 4 hours PRN Breakthrough pain  ondansetron Injectable 4 milliGRAM(s) IV Push every 6 hours PRN Nausea and/or Vomiting  oxyCODONE    IR 10 milliGRAM(s) Oral every 4 hours PRN mild - moderate pain  oxyCODONE    IR 15 milliGRAM(s) Oral every 4 hours PRN Severe Pain (7 - 10)

## 2018-09-02 LAB
APTT BLD: 59.7 SEC — HIGH (ref 27.5–37.4)
APTT BLD: 76.3 SEC — HIGH (ref 27.5–37.4)
HCT VFR BLD CALC: 37 % — SIGNIFICANT CHANGE UP (ref 37–47)
HGB BLD-MCNC: 10.9 G/DL — LOW (ref 12–16)
MCHC RBC-ENTMCNC: 26.7 PG — LOW (ref 27–31)
MCHC RBC-ENTMCNC: 29.5 G/DL — LOW (ref 32–36)
MCV RBC AUTO: 90.7 FL — SIGNIFICANT CHANGE UP (ref 81–99)
PLATELET # BLD AUTO: 218 K/UL — SIGNIFICANT CHANGE UP (ref 150–400)
RBC # BLD: 4.08 M/UL — LOW (ref 4.4–5.2)
RBC # FLD: 16.1 % — HIGH (ref 11–15.6)
WBC # BLD: 8.7 K/UL — SIGNIFICANT CHANGE UP (ref 4.8–10.8)
WBC # FLD AUTO: 8.7 K/UL — SIGNIFICANT CHANGE UP (ref 4.8–10.8)

## 2018-09-02 PROCEDURE — 99232 SBSQ HOSP IP/OBS MODERATE 35: CPT

## 2018-09-02 RX ORDER — RIVAROXABAN 15 MG-20MG
15 KIT ORAL
Qty: 0 | Refills: 0 | Status: DISCONTINUED | OUTPATIENT
Start: 2018-09-02 | End: 2018-09-04

## 2018-09-02 RX ADMIN — HYDROMORPHONE HYDROCHLORIDE 0.5 MILLIGRAM(S): 2 INJECTION INTRAMUSCULAR; INTRAVENOUS; SUBCUTANEOUS at 04:43

## 2018-09-02 RX ADMIN — Medication 650 MILLIGRAM(S): at 12:16

## 2018-09-02 RX ADMIN — OXYCODONE HYDROCHLORIDE 15 MILLIGRAM(S): 5 TABLET ORAL at 02:43

## 2018-09-02 RX ADMIN — HEPARIN SODIUM 1600 UNIT(S)/HR: 5000 INJECTION INTRAVENOUS; SUBCUTANEOUS at 01:39

## 2018-09-02 RX ADMIN — Medication 650 MILLIGRAM(S): at 17:01

## 2018-09-02 RX ADMIN — RIVAROXABAN 15 MILLIGRAM(S): KIT at 18:37

## 2018-09-02 RX ADMIN — Medication 100 MILLIGRAM(S): at 12:16

## 2018-09-02 RX ADMIN — Medication 650 MILLIGRAM(S): at 00:17

## 2018-09-02 RX ADMIN — OXYCODONE HYDROCHLORIDE 10 MILLIGRAM(S): 5 TABLET ORAL at 20:38

## 2018-09-02 RX ADMIN — Medication 650 MILLIGRAM(S): at 06:02

## 2018-09-02 RX ADMIN — Medication 650 MILLIGRAM(S): at 23:18

## 2018-09-02 RX ADMIN — HEPARIN SODIUM 1600 UNIT(S)/HR: 5000 INJECTION INTRAVENOUS; SUBCUTANEOUS at 10:01

## 2018-09-02 RX ADMIN — HYDROMORPHONE HYDROCHLORIDE 0.5 MILLIGRAM(S): 2 INJECTION INTRAMUSCULAR; INTRAVENOUS; SUBCUTANEOUS at 04:58

## 2018-09-02 RX ADMIN — PANTOPRAZOLE SODIUM 40 MILLIGRAM(S): 20 TABLET, DELAYED RELEASE ORAL at 06:02

## 2018-09-02 RX ADMIN — Medication 30 MILLILITER(S): at 00:35

## 2018-09-02 RX ADMIN — OXYCODONE HYDROCHLORIDE 10 MILLIGRAM(S): 5 TABLET ORAL at 21:08

## 2018-09-02 RX ADMIN — Medication 1 PATCH: at 12:31

## 2018-09-02 RX ADMIN — Medication 1 PATCH: at 12:16

## 2018-09-02 RX ADMIN — OXYCODONE HYDROCHLORIDE 15 MILLIGRAM(S): 5 TABLET ORAL at 01:43

## 2018-09-02 NOTE — PROGRESS NOTE ADULT - ASSESSMENT
> Acute PE without cor pulmonale - pt with history c/w immobility for prolonged periods.  Discussed sleeping arrangements, as she is homeless. RV intact on TTE.  Outpatient f/u with Dr. Caldera for results of hypercoagulable workup.    > Pulmonary Infarct - Pain control including Oxycodone, Dilaudid.  Add Tylenol RTC.  Pain appears to be controlled.   > Smoker - Nicotine Patch.    > R breast skin thickening with b/l mild axillary adenopathy - Pt wishing for outpatient f/u.   > Thyroid nodule - as above, discussed need for endocrine evaluation as outpatient as she did not wish for further inpatient workup.  Pt acknowledged understanding. > Acute PE without cor pulmonale - pt with history c/w immobility for prolonged periods.  Discussed sleeping arrangements, as she is homeless. RV intact on TTE.  Outpatient f/u with Dr. Caldera for results of hypercoagulable workup.  Transition ot po anticoagulation.   > Pulmonary Infarct - Pain control including Oxycodone, Dilaudid.  Add Tylenol RTC.  Pain appears to be controlled.   > Smoker - Nicotine Patch.    > R breast skin thickening with b/l mild axillary adenopathy - Pt wishing for outpatient f/u.   > Thyroid nodule - as above, discussed need for endocrine evaluation as outpatient as she did not wish for further inpatient workup.  Pt acknowledged understanding.      Probable d/c in am.

## 2018-09-02 NOTE — PROGRESS NOTE ADULT - SUBJECTIVE AND OBJECTIVE BOX
Patient: BARNEY ANDRE [id number] 38y Female                           Internal Medicine Hospitalist Progress Note  Chief Complaint: Patient is a 38y old  Female who presents with a chief complaint of chest pain (31 Aug 2018 11:25)    HPI:  38y homeless female PMH GERD presents c/o R pleuritic chest pain and mild x 2 days, sudden onset, worse with deep inspiration.  No cough / fever / chills.  No hemoptysis.  She had been treated 1 day ago for possible pneumonia and started on po antibiotics.  She returned with worsening symptoms, found to be hypoxic, SaO2 92% on RA, CT chest showing Right lower lobe segmental pulmonary embolism with associated pulmonary infarct. No right heart strain.  Pt admits to sleeping seated in car, has been experiencing intermittent b/l LE swelling when she wakes up over past 2 months.  Denies OCP use.  Symptoms improving in ED.  (31 Aug 2018 11:25)  B/l LE dopplers negative for DVT.     Pt reports R chest pain on deep inspiration, now dramatically improving.  Some RYAN.  No cough/ hemoptysis.  no fever / chills.  No leg pain.  SaO2 92% on RA.      ____________________PHYSICAL EXAM:  Vitals reviewed as indicated below  GENERAL:  NAD Alert and Oriented x 3   HEENT: NCAT  CARDIOVASCULAR:  S1, S2  LUNGS: R basilar crackles.   ABDOMEN:  soft, (-) tenderness, (-) distension, (+) bowel sounds, (-) guarding, (-) rebound (-) rigidity  EXTREMITIES:  no cyanosis / clubbing / edema.   ____________________  VITALS:  Vital Signs Last 24 Hrs  T(C): 36.8 (02 Sep 2018 11:35), Max: 37.8 (02 Sep 2018 05:21)  T(F): 98.2 (02 Sep 2018 11:35), Max: 100.1 (02 Sep 2018 05:21)  HR: 91 (02 Sep 2018 11:35) (90 - 99)  BP: 107/74 (02 Sep 2018 11:35) (107/74 - 124/77)  BP(mean): --  RR: 20 (02 Sep 2018 11:35) (18 - 20)  SpO2: 92% (02 Sep 2018 12:59) (92% - 98%) Daily     Daily   CAPILLARY BLOOD GLUCOSE        I&O's Summary      LABS:                        10.9   8.7   )-----------( 218      ( 02 Sep 2018 09:22 )             37.0     09-01    137  |  101  |  5.0<L>  ----------------------------<  118<H>  4.1   |  25.0  |  0.44<L>    Ca    8.9      01 Sep 2018 07:46      PTT - ( 02 Sep 2018 09:22 )  PTT:59.7 sec            MEDICATIONS:  acetaminophen   Tablet 650 milliGRAM(s) Oral every 6 hours  docusate sodium 100 milliGRAM(s) Oral daily  HYDROmorphone  Injectable 0.5 milliGRAM(s) IV Push every 4 hours PRN  nicotine -   7 mG/24Hr(s) Patch 1 patch Transdermal daily  ondansetron Injectable 4 milliGRAM(s) IV Push every 6 hours PRN  oxyCODONE    IR 10 milliGRAM(s) Oral every 4 hours PRN  oxyCODONE    IR 15 milliGRAM(s) Oral every 4 hours PRN  pantoprazole    Tablet 40 milliGRAM(s) Oral before breakfast  rivaroxaban 15 milliGRAM(s) Oral two times a day Patient: BARNEY ANDRE [id number] 38y Female                           Internal Medicine Hospitalist Progress Note  Chief Complaint: Patient is a 38y old  Female who presents with a chief complaint of chest pain (31 Aug 2018 11:25)    HPI:  38y homeless female PMH GERD presents c/o R pleuritic chest pain and mild x 2 days, sudden onset, worse with deep inspiration.  No cough / fever / chills.  No hemoptysis.  She had been treated 1 day ago for possible pneumonia and started on po antibiotics.  She returned with worsening symptoms, found to be hypoxic, SaO2 92% on RA, CT chest showing Right lower lobe segmental pulmonary embolism with associated pulmonary infarct. No right heart strain.  Pt admits to sleeping seated in car, has been experiencing intermittent b/l LE swelling when she wakes up over past 2 months.  Denies OCP use.  Symptoms improving in ED.  (31 Aug 2018 11:25)  B/l LE dopplers negative for DVT.     Pt reports R chest pain on deep inspiration, now dramatically improving.  Had epistaxis yesterday, now resolved.  Some RYAN.  No cough/ hemoptysis.  no fever / chills.  No leg pain.  SaO2 92% on RA.      ____________________PHYSICAL EXAM:  Vitals reviewed as indicated below  GENERAL:  NAD Alert and Oriented x 3   HEENT: NCAT  CARDIOVASCULAR:  S1, S2  LUNGS: R basilar crackles.   ABDOMEN:  soft, (-) tenderness, (-) distension, (+) bowel sounds, (-) guarding, (-) rebound (-) rigidity  EXTREMITIES:  no cyanosis / clubbing / edema.   ____________________  VITALS:  Vital Signs Last 24 Hrs  T(C): 36.8 (02 Sep 2018 11:35), Max: 37.8 (02 Sep 2018 05:21)  T(F): 98.2 (02 Sep 2018 11:35), Max: 100.1 (02 Sep 2018 05:21)  HR: 91 (02 Sep 2018 11:35) (90 - 99)  BP: 107/74 (02 Sep 2018 11:35) (107/74 - 124/77)  BP(mean): --  RR: 20 (02 Sep 2018 11:35) (18 - 20)  SpO2: 92% (02 Sep 2018 12:59) (92% - 98%) Daily     Daily   CAPILLARY BLOOD GLUCOSE        I&O's Summary      LABS:                        10.9   8.7   )-----------( 218      ( 02 Sep 2018 09:22 )             37.0     09-01    137  |  101  |  5.0<L>  ----------------------------<  118<H>  4.1   |  25.0  |  0.44<L>    Ca    8.9      01 Sep 2018 07:46      PTT - ( 02 Sep 2018 09:22 )  PTT:59.7 sec            MEDICATIONS:  acetaminophen   Tablet 650 milliGRAM(s) Oral every 6 hours  docusate sodium 100 milliGRAM(s) Oral daily  HYDROmorphone  Injectable 0.5 milliGRAM(s) IV Push every 4 hours PRN  nicotine -   7 mG/24Hr(s) Patch 1 patch Transdermal daily  ondansetron Injectable 4 milliGRAM(s) IV Push every 6 hours PRN  oxyCODONE    IR 10 milliGRAM(s) Oral every 4 hours PRN  oxyCODONE    IR 15 milliGRAM(s) Oral every 4 hours PRN  pantoprazole    Tablet 40 milliGRAM(s) Oral before breakfast  rivaroxaban 15 milliGRAM(s) Oral two times a day

## 2018-09-03 ENCOUNTER — TRANSCRIPTION ENCOUNTER (OUTPATIENT)
Age: 38
End: 2018-09-03

## 2018-09-03 LAB
APTT BLD: 30.7 SEC — SIGNIFICANT CHANGE UP (ref 27.5–37.4)
HCT VFR BLD CALC: 36.1 % — LOW (ref 37–47)
HGB BLD-MCNC: 11.2 G/DL — LOW (ref 12–16)
MCHC RBC-ENTMCNC: 27.2 PG — SIGNIFICANT CHANGE UP (ref 27–31)
MCHC RBC-ENTMCNC: 31 G/DL — LOW (ref 32–36)
MCV RBC AUTO: 87.6 FL — SIGNIFICANT CHANGE UP (ref 81–99)
PLATELET # BLD AUTO: 240 K/UL — SIGNIFICANT CHANGE UP (ref 150–400)
RBC # BLD: 4.12 M/UL — LOW (ref 4.4–5.2)
RBC # FLD: 16 % — HIGH (ref 11–15.6)
T4 FREE SERPL-MCNC: 1 NG/DL — SIGNIFICANT CHANGE UP (ref 0.9–1.8)
TSH SERPL-MCNC: 0.84 UIU/ML — SIGNIFICANT CHANGE UP (ref 0.27–4.2)
WBC # BLD: 7.5 K/UL — SIGNIFICANT CHANGE UP (ref 4.8–10.8)
WBC # FLD AUTO: 7.5 K/UL — SIGNIFICANT CHANGE UP (ref 4.8–10.8)

## 2018-09-03 PROCEDURE — 99239 HOSP IP/OBS DSCHRG MGMT >30: CPT

## 2018-09-03 RX ORDER — OMEPRAZOLE 10 MG/1
1 CAPSULE, DELAYED RELEASE ORAL
Qty: 0 | Refills: 0 | COMMUNITY

## 2018-09-03 RX ORDER — NICOTINE POLACRILEX 2 MG
1 GUM BUCCAL
Qty: 30 | Refills: 0 | OUTPATIENT
Start: 2018-09-03

## 2018-09-03 RX ORDER — RIVAROXABAN 15 MG-20MG
1 KIT ORAL
Qty: 40 | Refills: 0 | OUTPATIENT
Start: 2018-09-03

## 2018-09-03 RX ORDER — OXYCODONE HYDROCHLORIDE 5 MG/1
1 TABLET ORAL
Qty: 20 | Refills: 0 | OUTPATIENT
Start: 2018-09-03

## 2018-09-03 RX ADMIN — Medication 650 MILLIGRAM(S): at 17:23

## 2018-09-03 RX ADMIN — PANTOPRAZOLE SODIUM 40 MILLIGRAM(S): 20 TABLET, DELAYED RELEASE ORAL at 08:58

## 2018-09-03 RX ADMIN — RIVAROXABAN 15 MILLIGRAM(S): KIT at 08:58

## 2018-09-03 RX ADMIN — Medication 100 MILLIGRAM(S): at 11:29

## 2018-09-03 RX ADMIN — RIVAROXABAN 15 MILLIGRAM(S): KIT at 17:23

## 2018-09-03 RX ADMIN — Medication 1 PATCH: at 12:05

## 2018-09-03 RX ADMIN — Medication 650 MILLIGRAM(S): at 08:58

## 2018-09-03 RX ADMIN — Medication 1 PATCH: at 11:29

## 2018-09-03 RX ADMIN — Medication 650 MILLIGRAM(S): at 11:29

## 2018-09-03 NOTE — DISCHARGE NOTE ADULT - PLAN OF CARE
stable for discharge It is important to see your primary physician as well as the physicians noted below within the next week to perform a comprehensive medical review.  Call their offices for an appointment as soon as you leave the hospital.  If you do not have a primary physician, contact the [facility] [phone] located on [address] [place] [address].  Your medical issues appear to be stable at this time, but if your symptoms recur or worsen, contact your physicians and/or return to the hospital if necessary.  If you encounter any issues or questions with your medication, call your physicians before stopping the medication.  Do not drive.  It is important to remain on blood thinners for approximately 3-6 months.  followup with your doctors, including Pulmonary and Hematology. Be sure to followup with your doctor regarding further workup, including probable mammogram. Be sure to followup with your doctor regarding further workup for R thyroid 0.7cm nodule

## 2018-09-03 NOTE — PROGRESS NOTE ADULT - SUBJECTIVE AND OBJECTIVE BOX
Patient: BARNEY ANDRE [id number] 38y Female                           Internal Medicine Hospitalist Progress Note  Chief Complaint: Patient is a 38y old  Female who presents with a chief complaint of chest pain (31 Aug 2018 11:25)    HPI:  38y homeless female PMH GERD presents c/o R pleuritic chest pain and mild x 2 days, sudden onset, worse with deep inspiration.  No cough / fever / chills.  No hemoptysis.  She had been treated 1 day ago for possible pneumonia and started on po antibiotics.  She returned with worsening symptoms, found to be hypoxic, SaO2 92% on RA, CT chest showing Right lower lobe segmental pulmonary embolism with associated pulmonary infarct. No right heart strain.  Pt admits to sleeping seated in car, has been experiencing intermittent b/l LE swelling when she wakes up over past 2 months.  Denies OCP use.  Symptoms improving in ED.  (31 Aug 2018 11:25)  B/l LE dopplers negative for DVT.     Pt reports R chest pain on deep inspiration, now dramatically improving.  No further epistaxis.  Ambulating freely.  No cough/ hemoptysis.  no fever / chills.  No leg pain.  SaO2 92%-93% on RA.  Boyfriend at bedside.     ____________________PHYSICAL EXAM:  Vitals reviewed as indicated below  GENERAL:  NAD Alert and Oriented x 3   HEENT: NCAT  CARDIOVASCULAR:  S1, S2  LUNGS: R basilar crackles.   ABDOMEN:  soft, (-) tenderness, (-) distension, (+) bowel sounds, (-) guarding, (-) rebound (-) rigidity  EXTREMITIES:  no cyanosis / clubbing / edema.   ____________________    VITALS:  Vital Signs Last 24 Hrs  T(C): 37.2 (03 Sep 2018 05:10), Max: 37.6 (02 Sep 2018 20:09)  T(F): 98.9 (03 Sep 2018 05:10), Max: 99.7 (02 Sep 2018 20:09)  HR: 89 (03 Sep 2018 05:10) (89 - 102)  BP: 121/80 (03 Sep 2018 05:10) (107/74 - 121/80)  BP(mean): --  RR: 17 (03 Sep 2018 05:10) (17 - 20)  SpO2: 99% (03 Sep 2018 05:10) (92% - 99%) Daily     Daily   CAPILLARY BLOOD GLUCOSE        I&O's Summary    02 Sep 2018 07:01  -  03 Sep 2018 07:00  --------------------------------------------------------  IN: 1910 mL / OUT: 690 mL / NET: 1220 mL        LABS:                        11.2   7.5   )-----------( 240      ( 03 Sep 2018 07:27 )             36.1           PTT - ( 03 Sep 2018 07:27 )  PTT:30.7 sec            MEDICATIONS:  acetaminophen   Tablet 650 milliGRAM(s) Oral every 6 hours  docusate sodium 100 milliGRAM(s) Oral daily  HYDROmorphone  Injectable 0.5 milliGRAM(s) IV Push every 4 hours PRN  nicotine -   7 mG/24Hr(s) Patch 1 patch Transdermal daily  ondansetron Injectable 4 milliGRAM(s) IV Push every 6 hours PRN  oxyCODONE    IR 10 milliGRAM(s) Oral every 4 hours PRN  oxyCODONE    IR 15 milliGRAM(s) Oral every 4 hours PRN  pantoprazole    Tablet 40 milliGRAM(s) Oral before breakfast  rivaroxaban 15 milliGRAM(s) Oral two times a day

## 2018-09-03 NOTE — DISCHARGE NOTE ADULT - MEDICATION SUMMARY - MEDICATIONS TO TAKE
I will START or STAY ON the medications listed below when I get home from the hospital:    oxyCODONE 5 mg oral tablet  -- 1 tab(s) by mouth every 6 hours as needed moderate to severe pain MDD:4  -- Caution federal law prohibits the transfer of this drug to any person other  than the person for whom it was prescribed.  It is very important that you take or use this exactly as directed.  Do not skip doses or discontinue unless directed by your doctor.  May cause drowsiness.  Alcohol may intensify this effect.  Use care when operating dangerous machinery.  This prescription cannot be refilled.  Using more of this medication than prescribed may cause serious breathing problems.    -- Indication: For PE    rivaroxaban 15 mg oral tablet  -- 1 tab(s) by mouth 2 times a day.  See Pulmonary to adjust dosage after 20 days.   -- Indication: For PE    sucralfate 1 g/10 mL oral suspension  -- 10 milliliter(s) by mouth 4 times a day (before meals and at bedtime)  -- Do not take dairy products, antacids, or iron preparations within one hour of this medication.  It is very important that you take or use this exactly as directed.  Do not skip doses or discontinue unless directed by your doctor.  Shake well before use.  Take medication on an empty stomach 1 hour before or 2 to 3 hours after a meal unless otherwise directed by your doctor.    -- Indication: For Gastritis    omeprazole 20 mg oral delayed release capsule  -- 1 cap(s) by mouth once a day  -- It is very important that you take or use this exactly as directed.  Do not skip doses or discontinue unless directed by your doctor.  Obtain medical advice before taking any non-prescription drugs as some may affect the action of this medication.  Swallow whole.  Do not crush.    -- Indication: For Gastritis    nicotine 7 mg/24 hr transdermal film, extended release  -- 1 patch by transdermal patch once a day   -- Indication: For Smoking Cessation

## 2018-09-03 NOTE — DISCHARGE NOTE ADULT - PATIENT PORTAL LINK FT
You can access the [facility], offered by [facility], by registering with the following website: [email or web address]

## 2018-09-03 NOTE — DISCHARGE NOTE ADULT - SECONDARY DIAGNOSIS.
Chronic gastritis without bleeding, unspecified gastritis type Pleurodynia Breast thickening Thyroid nodule

## 2018-09-03 NOTE — DISCHARGE NOTE ADULT - CARE PROVIDERS DIRECT ADDRESSES
[email or web address]

## 2018-09-03 NOTE — PROGRESS NOTE ADULT - ASSESSMENT
> Acute PE without cor pulmonale - pt with history c/w immobility for prolonged periods.  Discussed sleeping arrangements, as she is homeless. RV intact on TTE.  Outpatient f/u with Dr. Caldera for results of hypercoagulable workup.  Transition ot po anticoagulation.   > Pulmonary Infarct - Pain control including Oxycodone, Dilaudid.  Add Tylenol RTC.  Pain appears to be controlled.   > Smoker - Nicotine Patch.    > R breast skin thickening with b/l mild axillary adenopathy - Pt wishing for outpatient f/u.   > Thyroid nodule - as above, discussed need for endocrine evaluation as outpatient as she did not wish for further inpatient workup.  Pt acknowledged understanding.      Discussed outpatient f/u regarding breast thickening and thyroid nodule.  Pt in agreement.  Plan discharge.  SW contacted regarding option of emergency housing.

## 2018-09-03 NOTE — DISCHARGE NOTE ADULT - CARE PROVIDER_API CALL
Lien Raman), Internal Medicine; Pulmonary Disease; Sleep Medicine  [address] [facility]  [place] [address]  Phone: [phone]  Fax: [phone]    Howard Caldera), HematologyOncology; HospiceThe Surgical Hospital at Southwoodsative Medicine; Internal Medicine  [address] [place] [address]  Phone: [phone]  Fax: [phone]

## 2018-09-03 NOTE — DISCHARGE NOTE ADULT - HOSPITAL COURSE
Patient: BARNEY ANDRE [id number]                 Internal Medicine Hospitalist Discharge Note  Chief Complaint: Patient is a 38y old  Female who presents with a chief complaint of chest pain (31 Aug 2018 11:25)    HPI:  38y homeless female PMH GERD presents c/o R pleuritic chest pain and mild x 2 days, sudden onset, worse with deep inspiration.  No cough / fever / chills.  No hemoptysis.  She had been treated 1 day ago for possible pneumonia and started on po antibiotics.  She returned with worsening symptoms, found to be hypoxic, SaO2 92% on RA, CT chest showing Right lower lobe segmental pulmonary embolism with associated pulmonary infarct. No right heart strain.  Pt admits to sleeping seated in car, has been experiencing intermittent b/l LE swelling when she wakes up over past 2 months.  Denies OCP use.  Symptoms improving in ED.  (31 Aug 2018 11:25)  B/l LE dopplers negative for DVT.     Pt reports R chest pain on deep inspiration, now dramatically improving.  No further epistaxis.  Ambulating freely.  No cough/ hemoptysis.  no fever / chills.  No leg pain.  SaO2 92%-93% on RA.  Boyfriend at bedside.     > Acute PE without cor pulmonale - pt with history c/w immobility for prolonged periods.  Discussed sleeping arrangements, as she is homeless. RV intact on TTE.  Outpatient f/u with Dr. Caldera for results of hypercoagulable workup.  Transition ot po anticoagulation.   > Pulmonary Infarct - Pain control including Oxycodone, Dilaudid.  Add Tylenol RTC.  Pain appears to be controlled.   > Smoker - Nicotine Patch.    > R breast skin thickening with b/l mild axillary adenopathy - Pt wishing for outpatient f/u.   > Thyroid nodule - as above, discussed need for endocrine evaluation as outpatient as she did not wish for further inpatient workup.  Pt acknowledged understanding.      Discussed outpatient f/u regarding breast thickening and thyroid nodule.  Pt in agreement.  Plan discharge.  SW contacted regarding option of emergency housing.       Disposition: stable for discharge.  Outpatient followup as above.  Patient was advised to return if they experience any recurrence or worsening of symptoms.  Total time spent on discharge was 35 minutes.  -Jesus Corea D.O., Hospitalist, [facility]

## 2018-09-03 NOTE — DISCHARGE NOTE ADULT - CARE PLAN
Principal Discharge DX:	Other acute pulmonary embolism without acute cor pulmonale  Goal:	stable for discharge  Assessment and plan of treatment:	It is important to see your primary physician as well as the physicians noted below within the next week to perform a comprehensive medical review.  Call their offices for an appointment as soon as you leave the hospital.  If you do not have a primary physician, contact the [facility] [phone] located on [address] [place] [address], [place] [address].  Your medical issues appear to be stable at this time, but if your symptoms recur or worsen, contact your physicians and/or return to the hospital if necessary.  If you encounter any issues or questions with your medication, call your physicians before stopping the medication.  Do not drive.  It is important to remain on blood thinners for approximately 3-6 months.  followup with your doctors, including Pulmonary and Hematology.  Secondary Diagnosis:	Chronic gastritis without bleeding, unspecified gastritis type  Secondary Diagnosis:	Pleurodynia  Secondary Diagnosis:	Breast thickening  Assessment and plan of treatment:	Be sure to followup with your doctor regarding further workup, including probable mammogram.  Secondary Diagnosis:	Thyroid nodule  Assessment and plan of treatment:	Be sure to followup with your doctor regarding further workup for R thyroid 0.7cm nodule

## 2018-09-04 VITALS
HEART RATE: 99 BPM | TEMPERATURE: 98 F | SYSTOLIC BLOOD PRESSURE: 123 MMHG | DIASTOLIC BLOOD PRESSURE: 80 MMHG | RESPIRATION RATE: 18 BRPM

## 2018-09-04 LAB
AT III ACT/NOR PPP CHRO: 98 % — SIGNIFICANT CHANGE UP (ref 85–135)
B2 GLYCOPROT1 AB SER QL: NEGATIVE — SIGNIFICANT CHANGE UP
CARDIOLIPIN AB SER-ACNC: NEGATIVE — SIGNIFICANT CHANGE UP
HCT VFR BLD CALC: 38.5 % — SIGNIFICANT CHANGE UP (ref 37–47)
HGB BLD-MCNC: 11.6 G/DL — LOW (ref 12–16)
MCHC RBC-ENTMCNC: 26.5 PG — LOW (ref 27–31)
MCHC RBC-ENTMCNC: 30.1 G/DL — LOW (ref 32–36)
MCV RBC AUTO: 87.9 FL — SIGNIFICANT CHANGE UP (ref 81–99)
PLATELET # BLD AUTO: 284 K/UL — SIGNIFICANT CHANGE UP (ref 150–400)
RBC # BLD: 4.38 M/UL — LOW (ref 4.4–5.2)
RBC # FLD: 15.9 % — HIGH (ref 11–15.6)
WBC # BLD: 6.8 K/UL — SIGNIFICANT CHANGE UP (ref 4.8–10.8)
WBC # FLD AUTO: 6.8 K/UL — SIGNIFICANT CHANGE UP (ref 4.8–10.8)

## 2018-09-04 PROCEDURE — 85730 THROMBOPLASTIN TIME PARTIAL: CPT

## 2018-09-04 PROCEDURE — 99232 SBSQ HOSP IP/OBS MODERATE 35: CPT

## 2018-09-04 PROCEDURE — 96365 THER/PROPH/DIAG IV INF INIT: CPT | Mod: XU

## 2018-09-04 PROCEDURE — 87040 BLOOD CULTURE FOR BACTERIA: CPT

## 2018-09-04 PROCEDURE — 93306 TTE W/DOPPLER COMPLETE: CPT

## 2018-09-04 PROCEDURE — 83605 ASSAY OF LACTIC ACID: CPT

## 2018-09-04 PROCEDURE — 85610 PROTHROMBIN TIME: CPT

## 2018-09-04 PROCEDURE — 84484 ASSAY OF TROPONIN QUANT: CPT

## 2018-09-04 PROCEDURE — 71275 CT ANGIOGRAPHY CHEST: CPT

## 2018-09-04 PROCEDURE — 86146 BETA-2 GLYCOPROTEIN ANTIBODY: CPT

## 2018-09-04 PROCEDURE — 86147 CARDIOLIPIN ANTIBODY EA IG: CPT

## 2018-09-04 PROCEDURE — 81241 F5 GENE: CPT

## 2018-09-04 PROCEDURE — 71046 X-RAY EXAM CHEST 2 VIEWS: CPT

## 2018-09-04 PROCEDURE — 80048 BASIC METABOLIC PNL TOTAL CA: CPT

## 2018-09-04 PROCEDURE — 93970 EXTREMITY STUDY: CPT

## 2018-09-04 PROCEDURE — 80053 COMPREHEN METABOLIC PANEL: CPT

## 2018-09-04 PROCEDURE — 81240 F2 GENE: CPT

## 2018-09-04 PROCEDURE — 84439 ASSAY OF FREE THYROXINE: CPT

## 2018-09-04 PROCEDURE — 99285 EMERGENCY DEPT VISIT HI MDM: CPT | Mod: 25

## 2018-09-04 PROCEDURE — 36415 COLL VENOUS BLD VENIPUNCTURE: CPT

## 2018-09-04 PROCEDURE — 84443 ASSAY THYROID STIM HORMONE: CPT

## 2018-09-04 PROCEDURE — 96375 TX/PRO/DX INJ NEW DRUG ADDON: CPT | Mod: XU

## 2018-09-04 PROCEDURE — 85027 COMPLETE CBC AUTOMATED: CPT

## 2018-09-04 PROCEDURE — 85300 ANTITHROMBIN III ACTIVITY: CPT

## 2018-09-04 RX ADMIN — PANTOPRAZOLE SODIUM 40 MILLIGRAM(S): 20 TABLET, DELAYED RELEASE ORAL at 05:50

## 2018-09-04 RX ADMIN — RIVAROXABAN 15 MILLIGRAM(S): KIT at 05:50

## 2018-09-04 NOTE — PROGRESS NOTE ADULT - SUBJECTIVE AND OBJECTIVE BOX
Patient: BARNEY ANDRE [id number] 38y Female                           Internal Medicine Hospitalist Progress Note  Chief Complaint: Patient is a 38y old  Female who presents with a chief complaint of chest pain (31 Aug 2018 11:25)    HPI:  38y homeless female PMH GERD presents c/o R pleuritic chest pain and mild x 2 days, sudden onset, worse with deep inspiration.  No cough / fever / chills.  No hemoptysis.  She had been treated 1 day ago for possible pneumonia and started on po antibiotics.  She returned with worsening symptoms, found to be hypoxic, SaO2 92% on RA, CT chest showing Right lower lobe segmental pulmonary embolism with associated pulmonary infarct. No right heart strain.  Pt admits to sleeping seated in car, has been experiencing intermittent b/l LE swelling when she wakes up over past 2 months.  Denies OCP use.  Symptoms improving in ED.  (31 Aug 2018 11:25)  B/l LE dopplers negative for DVT.     Chest pain, SOB significantly improving.  Reports now near baseline.  Denies bleeding.  Ambulating freely.  No additional complaints.     ____________________PHYSICAL EXAM:  Vitals reviewed as indicated below  GENERAL:  NAD Alert and Oriented x 3   HEENT: NCAT  CARDIOVASCULAR:  S1, S2  LUNGS: R basilar crackles.   ABDOMEN:  soft, (-) tenderness, (-) distension, (+) bowel sounds, (-) guarding, (-) rebound (-) rigidity  EXTREMITIES:  no cyanosis / clubbing / edema.   ____________________    VITALS:  Vital Signs Last 24 Hrs  T(C): 37.3 (04 Sep 2018 04:09), Max: 37.3 (04 Sep 2018 04:09)  T(F): 99.1 (04 Sep 2018 04:09), Max: 99.1 (04 Sep 2018 04:09)  HR: 84 (04 Sep 2018 04:09) (84 - 108)  BP: 113/80 (04 Sep 2018 04:09) (113/80 - 123/77)  BP(mean): --  RR: 17 (03 Sep 2018 22:15) (17 - 18)  SpO2: 95% (03 Sep 2018 22:15) (95% - 95%) Daily     Daily   CAPILLARY BLOOD GLUCOSE        I&O's Summary    03 Sep 2018 07:01  -  04 Sep 2018 07:00  --------------------------------------------------------  IN: 1010 mL / OUT: 541 mL / NET: 469 mL        LABS:                        11.6   6.8   )-----------( 284      ( 04 Sep 2018 08:26 )             38.5           PTT - ( 03 Sep 2018 07:27 )  PTT:30.7 sec            MEDICATIONS:  acetaminophen   Tablet 650 milliGRAM(s) Oral every 6 hours  docusate sodium 100 milliGRAM(s) Oral daily  HYDROmorphone  Injectable 0.5 milliGRAM(s) IV Push every 4 hours PRN  nicotine -   7 mG/24Hr(s) Patch 1 patch Transdermal daily  ondansetron Injectable 4 milliGRAM(s) IV Push every 6 hours PRN  oxyCODONE    IR 10 milliGRAM(s) Oral every 4 hours PRN  oxyCODONE    IR 15 milliGRAM(s) Oral every 4 hours PRN  pantoprazole    Tablet 40 milliGRAM(s) Oral before breakfast  rivaroxaban 15 milliGRAM(s) Oral two times a day

## 2018-09-04 NOTE — PROGRESS NOTE ADULT - ASSESSMENT
> Acute PE without cor pulmonale - pt with history c/w immobility for prolonged periods.  Discussed sleeping arrangements, as she is homeless. RV intact on TTE.  Outpatient f/u with Dr. Caldera for results of hypercoagulable workup.  Transition ot po anticoagulation.   > Pulmonary Infarct - Pain control including Oxycodone, Dilaudid.  Add Tylenol RTC.  Pain appears to be controlled.   > Smoker - Nicotine Patch.    > R breast skin thickening with b/l mild axillary adenopathy - Pt wishing for outpatient f/u.   > Thyroid nodule - as above, discussed need for endocrine evaluation as outpatient as she did not wish for further inpatient workup.  Pt acknowledged understanding.      Discussed outpatient f/u regarding breast thickening and thyroid nodule.  Pt in agreement.  Plan discharge.  Pt refuses SW eval for emergency housing.

## 2018-09-05 LAB
CULTURE RESULTS: SIGNIFICANT CHANGE UP
CULTURE RESULTS: SIGNIFICANT CHANGE UP
SPECIMEN SOURCE: SIGNIFICANT CHANGE UP
SPECIMEN SOURCE: SIGNIFICANT CHANGE UP

## 2018-09-07 LAB
DNA PLOIDY SPEC FC-IMP: SIGNIFICANT CHANGE UP
PTR INTERPRETATION: SIGNIFICANT CHANGE UP

## 2018-12-18 ENCOUNTER — EMERGENCY (EMERGENCY)
Facility: HOSPITAL | Age: 38
LOS: 1 days | Discharge: HOME | End: 2018-12-18
Attending: EMERGENCY MEDICINE
Payer: MEDICAID

## 2018-12-18 VITALS
OXYGEN SATURATION: 98 % | WEIGHT: 220.02 LBS | HEART RATE: 106 BPM | RESPIRATION RATE: 18 BRPM | DIASTOLIC BLOOD PRESSURE: 92 MMHG | TEMPERATURE: 98 F | HEIGHT: 66 IN | SYSTOLIC BLOOD PRESSURE: 129 MMHG

## 2018-12-18 VITALS
OXYGEN SATURATION: 98 % | HEART RATE: 90 BPM | RESPIRATION RATE: 18 BRPM | DIASTOLIC BLOOD PRESSURE: 71 MMHG | TEMPERATURE: 99 F | SYSTOLIC BLOOD PRESSURE: 110 MMHG

## 2018-12-18 DIAGNOSIS — Z98.890 OTHER SPECIFIED POSTPROCEDURAL STATES: Chronic | ICD-10-CM

## 2018-12-18 LAB
ALBUMIN SERPL ELPH-MCNC: 4.1 G/DL — SIGNIFICANT CHANGE UP (ref 3.3–5.2)
ALP SERPL-CCNC: 83 U/L — SIGNIFICANT CHANGE UP (ref 40–120)
ALT FLD-CCNC: 16 U/L — SIGNIFICANT CHANGE UP
ANION GAP SERPL CALC-SCNC: 11 MMOL/L — SIGNIFICANT CHANGE UP (ref 5–17)
APTT BLD: 24.2 SEC — LOW (ref 27.5–36.3)
AST SERPL-CCNC: 21 U/L — SIGNIFICANT CHANGE UP
BASOPHILS # BLD AUTO: 0 K/UL — SIGNIFICANT CHANGE UP (ref 0–0.2)
BASOPHILS NFR BLD AUTO: 0.4 % — SIGNIFICANT CHANGE UP (ref 0–2)
BILIRUB SERPL-MCNC: 0.6 MG/DL — SIGNIFICANT CHANGE UP (ref 0.4–2)
BUN SERPL-MCNC: 6 MG/DL — LOW (ref 8–20)
CALCIUM SERPL-MCNC: 8.9 MG/DL — SIGNIFICANT CHANGE UP (ref 8.6–10.2)
CHLORIDE SERPL-SCNC: 101 MMOL/L — SIGNIFICANT CHANGE UP (ref 98–107)
CO2 SERPL-SCNC: 25 MMOL/L — SIGNIFICANT CHANGE UP (ref 22–29)
CREAT SERPL-MCNC: 0.49 MG/DL — LOW (ref 0.5–1.3)
EOSINOPHIL # BLD AUTO: 0 K/UL — SIGNIFICANT CHANGE UP (ref 0–0.5)
EOSINOPHIL NFR BLD AUTO: 0.8 % — SIGNIFICANT CHANGE UP (ref 0–6)
GLUCOSE SERPL-MCNC: 100 MG/DL — SIGNIFICANT CHANGE UP (ref 70–115)
HCT VFR BLD CALC: 40.3 % — SIGNIFICANT CHANGE UP (ref 37–47)
HGB BLD-MCNC: 12.6 G/DL — SIGNIFICANT CHANGE UP (ref 12–16)
INR BLD: 1.01 RATIO — SIGNIFICANT CHANGE UP (ref 0.88–1.16)
LYMPHOCYTES # BLD AUTO: 2 K/UL — SIGNIFICANT CHANGE UP (ref 1–4.8)
LYMPHOCYTES # BLD AUTO: 38.3 % — SIGNIFICANT CHANGE UP (ref 20–55)
MCHC RBC-ENTMCNC: 27.6 PG — SIGNIFICANT CHANGE UP (ref 27–31)
MCHC RBC-ENTMCNC: 31.3 G/DL — LOW (ref 32–36)
MCV RBC AUTO: 88.4 FL — SIGNIFICANT CHANGE UP (ref 81–99)
MONOCYTES # BLD AUTO: 0.3 K/UL — SIGNIFICANT CHANGE UP (ref 0–0.8)
MONOCYTES NFR BLD AUTO: 6 % — SIGNIFICANT CHANGE UP (ref 3–10)
NEUTROPHILS # BLD AUTO: 2.9 K/UL — SIGNIFICANT CHANGE UP (ref 1.8–8)
NEUTROPHILS NFR BLD AUTO: 54.3 % — SIGNIFICANT CHANGE UP (ref 37–73)
PLATELET # BLD AUTO: 236 K/UL — SIGNIFICANT CHANGE UP (ref 150–400)
POTASSIUM SERPL-MCNC: 4 MMOL/L — SIGNIFICANT CHANGE UP (ref 3.5–5.3)
POTASSIUM SERPL-SCNC: 4 MMOL/L — SIGNIFICANT CHANGE UP (ref 3.5–5.3)
PROT SERPL-MCNC: 8.1 G/DL — SIGNIFICANT CHANGE UP (ref 6.6–8.7)
PROTHROM AB SERPL-ACNC: 11.6 SEC — SIGNIFICANT CHANGE UP (ref 10–12.9)
RBC # BLD: 4.56 M/UL — SIGNIFICANT CHANGE UP (ref 4.4–5.2)
RBC # FLD: 15.5 % — SIGNIFICANT CHANGE UP (ref 11–15.6)
SODIUM SERPL-SCNC: 137 MMOL/L — SIGNIFICANT CHANGE UP (ref 135–145)
WBC # BLD: 5.3 K/UL — SIGNIFICANT CHANGE UP (ref 4.8–10.8)
WBC # FLD AUTO: 5.3 K/UL — SIGNIFICANT CHANGE UP (ref 4.8–10.8)

## 2018-12-18 PROCEDURE — 80053 COMPREHEN METABOLIC PANEL: CPT

## 2018-12-18 PROCEDURE — 93971 EXTREMITY STUDY: CPT

## 2018-12-18 PROCEDURE — 36415 COLL VENOUS BLD VENIPUNCTURE: CPT

## 2018-12-18 PROCEDURE — 93971 EXTREMITY STUDY: CPT | Mod: 26,LT

## 2018-12-18 PROCEDURE — 99284 EMERGENCY DEPT VISIT MOD MDM: CPT

## 2018-12-18 PROCEDURE — 85610 PROTHROMBIN TIME: CPT

## 2018-12-18 PROCEDURE — 85730 THROMBOPLASTIN TIME PARTIAL: CPT

## 2018-12-18 PROCEDURE — 85027 COMPLETE CBC AUTOMATED: CPT

## 2018-12-18 RX ORDER — APIXABAN 2.5 MG/1
1 TABLET, FILM COATED ORAL
Qty: 1 | Refills: 0 | OUTPATIENT
Start: 2018-12-18 | End: 2019-01-16

## 2018-12-18 RX ORDER — APIXABAN 2.5 MG/1
2 TABLET, FILM COATED ORAL
Qty: 120 | Refills: 0 | OUTPATIENT
Start: 2018-12-18 | End: 2019-01-16

## 2018-12-18 RX ORDER — APIXABAN 2.5 MG/1
1 TABLET, FILM COATED ORAL
Qty: 60 | Refills: 0 | OUTPATIENT
Start: 2018-12-18 | End: 2019-01-16

## 2018-12-18 RX ORDER — SODIUM CHLORIDE 9 MG/ML
3 INJECTION INTRAMUSCULAR; INTRAVENOUS; SUBCUTANEOUS ONCE
Qty: 0 | Refills: 0 | Status: COMPLETED | OUTPATIENT
Start: 2018-12-18 | End: 2018-12-18

## 2018-12-18 RX ADMIN — SODIUM CHLORIDE 3 MILLILITER(S): 9 INJECTION INTRAMUSCULAR; INTRAVENOUS; SUBCUTANEOUS at 12:21

## 2018-12-18 NOTE — ED STATDOCS - PHYSICAL EXAMINATION
VITAL SIGNS: I have reviewed nursing notes and confirm.  CONSTITUTIONAL: Well-developed; well-nourished; in no acute distress.  SKIN: Skin exam is warm and dry, no acute rash.  HEAD: Normocephalic; atraumatic.  EYES: PERRL, EOM intact; conjunctiva and sclera clear.  ENT: No nasal discharge; airway clear. Throat clear.  NECK: Supple; non tender.  No lymphadenopathy.  CARD: S1, S2 normal; no murmurs, gallops, or rubs. Regular rate and rhythm. (+) left calf swelling  RESP: No wheezes,  no rales or rhonchi.   ABD: Normal bowel sounds; soft; non-distended; non-tender; no hepatosplenomegaly.  EXT: Normal ROM. No clubbing, cyanosis or edema.  NEURO: Alert, oriented. Grossly unremarkable. No focal deficits.   PSYCH: Cooperative, appropriate. VITAL SIGNS: I have reviewed nursing notes and confirm.  CONSTITUTIONAL: Well-developed; well-nourished; in no acute distress.  SKIN: Skin exam is warm and dry, no acute rash.  HEAD: Normocephalic; atraumatic.  EYES: PERRL, EOM intact; conjunctiva and sclera clear.  ENT: No nasal discharge; airway clear. Throat clear.  NECK: Supple; non tender.  No lymphadenopathy.  CARD: S1, S2 normal; no murmurs, gallops, or rubs. Regular rate and rhythm. (+) left calf swelling  RESP: No wheezes,  no rales or rhonchi.   ABD: Normal bowel sounds; soft; non-distended; non-tender; no hepatosplenomegaly.  EXT: Normal ROM. No clubbing, cyanosis. (+) Left calf TTP  NEURO: Alert, oriented. Grossly unremarkable. No focal deficits.   PSYCH: Cooperative, appropriate.

## 2018-12-18 NOTE — ED STATDOCS - OBJECTIVE STATEMENT
37 y/o F pt with hx of PE x5 months ago secondary to clot of RLE, chronic cig smoker since 13 y/o presents to ED c/o pain of left calf x2 days, constant described as pressure with squeezing sensation. Limited ROM of LE secondary to pain and swelling. Pt stopped taking medication of PE x2 months ago secondary to running out of prescription, stating she finished treatment. Denies SOB, CP, N/V, chance of pregnancy, any trauma, SHx. 39 y/o F pt with hx of PE x5 months ago RLE DVT secondary to sleeping in a truck, chronic cig smoker since 13 y/o presents to ED c/o pain of left calf x2 days, constant described as pressure with squeezing sensation. Limited ROM of LE secondary to pain and swelling. Pt finished a course of blood thinner x 2 months ago and was advised to follow up if she needed more, secondary to being homeless she was unable to do so. Pt currently homeless and has been sleeping in her truck.  Denies any other symptoms, SOB, CP, N/V, chance of pregnancy, any trauma, SHx.

## 2018-12-18 NOTE — ED STATDOCS - NS ED ROS FT
Review of Systems  •	CONSTITUTIONAL - no  fever, no diaphoresis, no weight change  •	SKIN - no rash  •	HEMATOLOGIC - no bleeding, no bruising  •	EYES - no eye pain, no blurred vision  •	ENT - no change in hearing, no pain  •	RESPIRATORY - no shortness of breath, no cough  •	CARDIAC - no chest pain, no palpitations (+) left calf edema  •	GI - no abd pain, no nausea, no vomiting, no diarrhea, no constipation, no bleeding  •	GENITO-URINARY - no discharge, no dysuria; no hematuria,   •	ENDO - no polydypsia, no polyurea, no heat/no cold intolerance  •	MUSCULOSKELETAL - no redness (+) left calf pain   •	NEUROLOGIC - no weakness, no headache, no anesthesia, no paresthesias  •	PSYCH - no anxiety, non suicidal, non homicidal, no hallucination, no depression

## 2018-12-18 NOTE — ED ADULT NURSE NOTE - NSIMPLEMENTINTERV_GEN_ALL_ED
Implemented All Fall with Harm Risk Interventions:  Roe to call system. Call bell, personal items and telephone within reach. Instruct patient to call for assistance. Room bathroom lighting operational. Non-slip footwear when patient is off stretcher. Physically safe environment: no spills, clutter or unnecessary equipment. Stretcher in lowest position, wheels locked, appropriate side rails in place. Provide visual cue, wrist band, yellow gown, etc. Monitor gait and stability. Monitor for mental status changes and reorient to person, place, and time. Review medications for side effects contributing to fall risk. Reinforce activity limits and safety measures with patient and family. Provide visual clues: red socks.

## 2018-12-18 NOTE — ED STATDOCS - ATTESTATION, MLM
I have reviewed and confirmed nurses' notes for patient's medications, allergies, medical history, and surgical history.
Yes

## 2018-12-18 NOTE — ED STATDOCS - NS ED MD DISPO DISCHARGE CCDA
After Visit Summary   3/28/2017    Ms. Jack Gibbs    MRN: [id number]           Patient Information     Date Of Birth          2001        Visit Information        Provider Department      3/28/2017 11:40 AM Gray Pastor DC  [facility]        Today's Diagnoses     Segmental and somatic dysfunction of cervical region    -  1    Cervicalgia        Segmental and somatic dysfunction of lumbar region        Segmental and somatic dysfunction of thoracic region           Follow-ups after your visit        Who to contact     If you have questions or need follow up information about today's clinic visit or your schedule please contact  [facility]ED[facility] directly at [phone].  Normal or non-critical lab and imaging results will be communicated to you by [employer], letter or phone within 4 business days after the clinic has received the results. If you do not hear from us within 7 days, please contact the clinic through [employer] or phone. If you have a critical or abnormal lab result, we will notify you by phone as soon as possible.  Submit refill requests through [employer] or call your pharmacy and they will forward the refill request to us. Please allow 3 business days for your refill to be completed.          Additional Information About Your Visit        MyChart Information     [employer] lets you send messages to your doctor, view your test results, renew your prescriptions, schedule appointments and more. To sign up, go to [email or web address], contact your [place] clinic or call [phone] during business hours.            Care EveryWhere ID     This is your Care EveryWhere ID. This could be used by other organizations to access your [place] medical records  [id number]         Blood Pressure from Last 3 Encounters:   11/10/16 122/65   10/02/16 137/80   08/26/15 132/97    Weight from Last 3 Encounters:   No data found for Wt              We Performed the Following      CHIROPRAC MANIP,SPINAL,3-4 REGIONS        Primary Care Provider Office Phone # Fax #    Saul Rodriguez [phone]       On license of [facility] [address] [facility] [address]        Thank you!     Thank you for choosing  [facility]  for your care. Our goal is always to provide you with excellent care. Hearing back from our patients is one way we can continue to improve our services. Please take a few minutes to complete the written survey that you may receive in the mail after your visit with us. Thank you!             Your Updated Medication List - Protect others around you: Learn how to safely use, store and throw away your medicines at www.disposemymeds.org.          This list is accurate as of: 3/28/17 11:59 PM.  Always use your most recent med list.                   Brand Name Dispense Instructions for use    DEPO-ESTRADIOL IM      Takes every 3 months in the [facility]       NO ACTIVE MEDICATIONS             Patient/Caregiver provided printed discharge information.

## 2018-12-18 NOTE — ED STATDOCS - CONDITION AT DISCHARGE:
Airway patent, Nasal mucosa clear. Mouth with normal mucosa. Throat has no vesicles, no oropharyngeal exudates and uvula is midline.
Satisfactory

## 2018-12-18 NOTE — ED STATDOCS - PROGRESS NOTE DETAILS
duplex showed DVT of left leg. Copy of the result given to patient. patient now has insurance. Will send Christie to [facility] and received a coupon. She will follow up with a doctor.

## 2018-12-18 NOTE — CHART NOTE - NSCHARTNOTEFT_GEN_A_CORE
SOCIAL WORK NOTE:  THIS WORKER RECEIVED NOTIFICATION FROM DR JORDAN THAT PATIENT HAS + DVT IN LEFT LEG AND WOULD NEED MEDS AND HAS NO INSURANCE.  MADE DR JORDAN AWARE TO CALL IN WHATEVER MEDS NECESSARY TO [facility] AND CASE MANAGEMENT WOULD WORK ON COST.  THIS WORKER REVIEWED CHART AND PATIENT HAS [place] [employer].  MADE HER AWARE OF SAME.  MD DECIDED TO ORDER ELLIQUIS FOR PATIENT AS BETR OPTION AND PATIENT WOULD NOT NEED LAB WORK AS SHE IS GOING TO BE MOVING UPSTATE WITH HER MOTHER SOON.  MADE [facility] AWARE OF THE ABOVE REGARDING FOLLOW UP WITH [facility] AND PATIENT FOR MD CONNECTIONS AND PRESCRIPTION RENEWALS.

## 2018-12-18 NOTE — ED STATDOCS - MEDICAL DECISION MAKING DETAILS
Pt Pt with hx of DVT of the right and PE, was on anticoagulants, now presents with left calf tenderness,  will order US to r/o DVT, order blood work, re-eval.

## 2019-12-17 ENCOUNTER — EMERGENCY (EMERGENCY)
Facility: HOSPITAL | Age: 39
LOS: 1 days | Discharge: HOME | End: 2019-12-17
Attending: EMERGENCY MEDICINE
Payer: MEDICAID

## 2019-12-17 VITALS
TEMPERATURE: 100 F | OXYGEN SATURATION: 97 % | HEIGHT: 64 IN | WEIGHT: 195.11 LBS | RESPIRATION RATE: 20 BRPM | DIASTOLIC BLOOD PRESSURE: 86 MMHG | SYSTOLIC BLOOD PRESSURE: 136 MMHG | HEART RATE: 97 BPM

## 2019-12-17 DIAGNOSIS — Z98.890 OTHER SPECIFIED POSTPROCEDURAL STATES: Chronic | ICD-10-CM

## 2019-12-17 PROBLEM — I82.409 ACUTE EMBOLISM AND THROMBOSIS OF UNSPECIFIED DEEP VEINS OF UNSPECIFIED LOWER EXTREMITY: Chronic | Status: ACTIVE | Noted: 2018-12-18

## 2019-12-17 PROBLEM — I26.99 OTHER PULMONARY EMBOLISM WITHOUT ACUTE COR PULMONALE: Chronic | Status: ACTIVE | Noted: 2018-12-18

## 2019-12-17 PROCEDURE — 93005 ELECTROCARDIOGRAM TRACING: CPT

## 2019-12-17 PROCEDURE — 71046 X-RAY EXAM CHEST 2 VIEWS: CPT

## 2019-12-17 PROCEDURE — 99285 EMERGENCY DEPT VISIT HI MDM: CPT

## 2019-12-17 PROCEDURE — 99285 EMERGENCY DEPT VISIT HI MDM: CPT | Mod: 25

## 2019-12-17 PROCEDURE — 94640 AIRWAY INHALATION TREATMENT: CPT

## 2019-12-17 PROCEDURE — 71046 X-RAY EXAM CHEST 2 VIEWS: CPT | Mod: 26

## 2019-12-17 PROCEDURE — 93010 ELECTROCARDIOGRAM REPORT: CPT

## 2019-12-17 RX ORDER — ALBUTEROL 90 UG/1
1 AEROSOL, METERED ORAL ONCE
Refills: 0 | Status: COMPLETED | OUTPATIENT
Start: 2019-12-17 | End: 2019-12-17

## 2019-12-17 RX ORDER — ACETAMINOPHEN 500 MG
650 TABLET ORAL ONCE
Refills: 0 | Status: COMPLETED | OUTPATIENT
Start: 2019-12-17 | End: 2019-12-17

## 2019-12-17 RX ORDER — IPRATROPIUM/ALBUTEROL SULFATE 18-103MCG
3 AEROSOL WITH ADAPTER (GRAM) INHALATION
Refills: 0 | Status: COMPLETED | OUTPATIENT
Start: 2019-12-17 | End: 2019-12-17

## 2019-12-17 RX ADMIN — Medication 3 MILLILITER(S): at 02:33

## 2019-12-17 RX ADMIN — Medication 60 MILLIGRAM(S): at 02:36

## 2019-12-17 RX ADMIN — Medication 3 MILLILITER(S): at 02:34

## 2019-12-17 RX ADMIN — Medication 650 MILLIGRAM(S): at 02:36

## 2019-12-17 RX ADMIN — ALBUTEROL 1 PUFF(S): 90 AEROSOL, METERED ORAL at 04:02

## 2019-12-17 RX ADMIN — Medication 3 MILLILITER(S): at 02:36

## 2019-12-17 NOTE — ED PROVIDER NOTE - OBJECTIVE STATEMENT
pt is a 38 y/o female presenting to the ed with productive cough with yellow phelgm, congestion, subjective fevers and body aches for the past few days. pt states she started experiencing shortness of breath with the cough tonight, states she is a smoker. pt with hx of blood clot one year ago, was on eliquis, no longer on, states she was homeless, slept in van and was immobile. pt states this does not feel similar to sxs she had with blood clot. pt denies calf pain or leg swelling, abd pain, nausea, vomiting, diarrhea

## 2019-12-17 NOTE — ED PROVIDER NOTE - ATTENDING CONTRIBUTION TO CARE
I personally saw the patient with the PA, and completed the key components of the history and physical exam. I then discussed the management plan with the PA.   gen in nad resp mild expiratory wheeze b/l no hemotpys cardiac no murmur abd soft skin no pedal edema   agree with pa plan of care

## 2019-12-17 NOTE — ED PROVIDER NOTE - PROGRESS NOTE DETAILS
improvement in breath sounds inhaler to pharamacy, steroids, follow up with pmd, return precautions discussed with patient

## 2019-12-17 NOTE — ED PROVIDER NOTE - PHYSICAL EXAMINATION
Const: Awake, alert and oriented. In no acute distress. Well appearing.  HEENT: NC/AT. Moist mucous membranes.  Eyes: No scleral icterus. EOMI.  Neck:. Soft and supple. Full ROM without pain.  Cardiac:  +S1/S2. No murmurs. Peripheral pulses 2+ and symmetric. No LE edema.  Resp: Speaking in full sentences. No evidence of respiratory distress. Wheezing noted in all lung bases   Abd: Soft, non-tender, non-distended. Normal bowel sounds in all 4 quadrants. No guarding or rebound.  Back: Spine midline and non-tender. No CVAT.  Skin: No rashes, abrasions or lacerations.  Lymph: No cervical lymphadenopathy.  Neuro: Awake, alert & oriented x 3. Moves all extremities symmetrically.

## 2019-12-17 NOTE — ED ADULT TRIAGE NOTE - CHIEF COMPLAINT QUOTE
Pt A&Ox4 states "It started with a scratchy throat and now I am having shortness of breath."  BIBA c/o .Worsening SOB. Patient having dry cough, felt feverish, did not take any medication.

## 2019-12-17 NOTE — ED PROVIDER NOTE - PATIENT PORTAL LINK FT
You can access the FollowMyHealth Patient Portal offered by [facility] by registering at the following website: [email or web address]. By joining [employer]’s FollowMyHealth portal, you will also be able to view your health information using other applications (apps) compatible with our system.

## 2022-09-28 NOTE — ED PROVIDER NOTE - DISCHARGE REVIEW MATERIAL PRESENTED
older with male non toxic no respiratory distress pt is asymptomatic chest pain presently pt does not detect tachycardia normal... .

## 2024-02-13 NOTE — ED ADULT TRIAGE NOTE - NS ED NOTE AC HIGH RISK COUNTRIES
Patients   called and would like for Sinai Butt to call him, he wants to get his wife in and the first opening was 2.20.24, she is having a problem with her left leg and it is effecting her foot and toes, they were told to keep an eye on it.  They are very concerned.   No

## 2024-10-14 NOTE — ED ADULT NURSE NOTE - CAS EDN DISCHARGE INTERVENTIONS
Pt stated mounjaro should be 7.5mg instead of 5mg. [facility] with other pended med   IV discontinued, cath removed intact